# Patient Record
Sex: FEMALE | Race: WHITE | HISPANIC OR LATINO | URBAN - METROPOLITAN AREA
[De-identification: names, ages, dates, MRNs, and addresses within clinical notes are randomized per-mention and may not be internally consistent; named-entity substitution may affect disease eponyms.]

---

## 2017-03-06 ENCOUNTER — INPATIENT (INPATIENT)
Facility: HOSPITAL | Age: 42
LOS: 4 days | Discharge: HOME | End: 2017-03-11
Attending: OBSTETRICS & GYNECOLOGY | Admitting: OBSTETRICS & GYNECOLOGY
Payer: COMMERCIAL

## 2017-03-06 VITALS — WEIGHT: 184.97 LBS | HEIGHT: 61 IN

## 2017-03-06 LAB
ALBUMIN SERPL ELPH-MCNC: 2.8 G/DL — LOW (ref 3.4–5)
ALP SERPL-CCNC: 138 U/L — HIGH (ref 40–120)
ALT FLD-CCNC: 14 U/L — SIGNIFICANT CHANGE UP (ref 12–42)
ANION GAP SERPL CALC-SCNC: 10 MMOL/L — SIGNIFICANT CHANGE UP (ref 9–16)
AST SERPL-CCNC: 12 U/L — LOW (ref 15–37)
BASOPHILS NFR BLD AUTO: 0.2 % — SIGNIFICANT CHANGE UP (ref 0–2)
BILIRUB SERPL-MCNC: 0.3 MG/DL — SIGNIFICANT CHANGE UP (ref 0.2–1.2)
BLD GP AB SCN SERPL QL: NEGATIVE — SIGNIFICANT CHANGE UP
BLD GP AB SCN SERPL QL: NEGATIVE — SIGNIFICANT CHANGE UP
BUN SERPL-MCNC: 12 MG/DL — SIGNIFICANT CHANGE UP (ref 7–23)
CALCIUM SERPL-MCNC: 9.4 MG/DL — SIGNIFICANT CHANGE UP (ref 8.5–10.5)
CHLORIDE SERPL-SCNC: 103 MMOL/L — SIGNIFICANT CHANGE UP (ref 96–108)
CO2 SERPL-SCNC: 22 MMOL/L — SIGNIFICANT CHANGE UP (ref 22–31)
CREAT SERPL-MCNC: 0.59 MG/DL — SIGNIFICANT CHANGE UP (ref 0.5–1.3)
EOSINOPHIL NFR BLD AUTO: 0.3 % — SIGNIFICANT CHANGE UP (ref 0–6)
GLUCOSE SERPL-MCNC: 121 MG/DL — HIGH (ref 70–99)
HCT VFR BLD CALC: 40.9 % — SIGNIFICANT CHANGE UP (ref 34.5–45)
HGB BLD-MCNC: 13.8 G/DL — SIGNIFICANT CHANGE UP (ref 11.5–15.5)
LDH SERPL L TO P-CCNC: 157 U/L — SIGNIFICANT CHANGE UP (ref 84–246)
LYMPHOCYTES # BLD AUTO: 29.2 % — SIGNIFICANT CHANGE UP (ref 13–44)
MCHC RBC-ENTMCNC: 28.6 PG — SIGNIFICANT CHANGE UP (ref 27–34)
MCHC RBC-ENTMCNC: 33.7 G/DL — SIGNIFICANT CHANGE UP (ref 32–36)
MCV RBC AUTO: 84.9 FL — SIGNIFICANT CHANGE UP (ref 80–100)
MONOCYTES NFR BLD AUTO: 9.4 % — SIGNIFICANT CHANGE UP (ref 2–14)
NEUTROPHILS NFR BLD AUTO: 60.9 % — SIGNIFICANT CHANGE UP (ref 43–77)
PLATELET # BLD AUTO: 158 K/UL — SIGNIFICANT CHANGE UP (ref 150–400)
POTASSIUM SERPL-MCNC: 4.3 MMOL/L — SIGNIFICANT CHANGE UP (ref 3.5–5.3)
POTASSIUM SERPL-SCNC: 4.3 MMOL/L — SIGNIFICANT CHANGE UP (ref 3.5–5.3)
PROT SERPL-MCNC: 7.8 G/DL — SIGNIFICANT CHANGE UP (ref 6.4–8.2)
RBC # BLD: 4.82 M/UL — SIGNIFICANT CHANGE UP (ref 3.8–5.2)
RBC # FLD: 14.9 % — SIGNIFICANT CHANGE UP (ref 10.3–16.9)
RH IG SCN BLD-IMP: POSITIVE — SIGNIFICANT CHANGE UP
RH IG SCN BLD-IMP: POSITIVE — SIGNIFICANT CHANGE UP
SODIUM SERPL-SCNC: 135 MMOL/L — SIGNIFICANT CHANGE UP (ref 135–145)
T PALLIDUM AB TITR SER: NEGATIVE — SIGNIFICANT CHANGE UP
URATE SERPL-MCNC: 4.4 MG/DL — SIGNIFICANT CHANGE UP (ref 2.6–6)
WBC # BLD: 6.4 K/UL — SIGNIFICANT CHANGE UP (ref 3.8–10.5)
WBC # FLD AUTO: 6.4 K/UL — SIGNIFICANT CHANGE UP (ref 3.8–10.5)

## 2017-03-06 RX ORDER — ACETAMINOPHEN 500 MG
650 TABLET ORAL EVERY 6 HOURS
Refills: 0 | Status: DISCONTINUED | OUTPATIENT
Start: 2017-03-06 | End: 2017-03-11

## 2017-03-06 RX ORDER — DOCUSATE SODIUM 100 MG
100 CAPSULE ORAL
Refills: 0 | Status: DISCONTINUED | OUTPATIENT
Start: 2017-03-06 | End: 2017-03-11

## 2017-03-06 RX ORDER — NALOXONE HYDROCHLORIDE 4 MG/.1ML
0.1 SPRAY NASAL
Refills: 0 | Status: DISCONTINUED | OUTPATIENT
Start: 2017-03-06 | End: 2017-03-11

## 2017-03-06 RX ORDER — CITRIC ACID/SODIUM CITRATE 300-500 MG
15 SOLUTION, ORAL ORAL EVERY 4 HOURS
Refills: 0 | Status: DISCONTINUED | OUTPATIENT
Start: 2017-03-06 | End: 2017-03-06

## 2017-03-06 RX ORDER — GLUCAGON INJECTION, SOLUTION 0.5 MG/.1ML
1 INJECTION, SOLUTION SUBCUTANEOUS ONCE
Refills: 0 | Status: DISCONTINUED | OUTPATIENT
Start: 2017-03-06 | End: 2017-03-08

## 2017-03-06 RX ORDER — DEXTROSE 50 % IN WATER 50 %
1 SYRINGE (ML) INTRAVENOUS ONCE
Refills: 0 | Status: DISCONTINUED | OUTPATIENT
Start: 2017-03-06 | End: 2017-03-08

## 2017-03-06 RX ORDER — SODIUM CHLORIDE 9 MG/ML
1000 INJECTION, SOLUTION INTRAVENOUS
Refills: 0 | Status: DISCONTINUED | OUTPATIENT
Start: 2017-03-06 | End: 2017-03-08

## 2017-03-06 RX ORDER — SODIUM CHLORIDE 9 MG/ML
1000 INJECTION, SOLUTION INTRAVENOUS ONCE
Refills: 0 | Status: DISCONTINUED | OUTPATIENT
Start: 2017-03-06 | End: 2017-03-06

## 2017-03-06 RX ORDER — HEPARIN SODIUM 5000 [USP'U]/ML
5000 INJECTION INTRAVENOUS; SUBCUTANEOUS EVERY 12 HOURS
Refills: 0 | Status: DISCONTINUED | OUTPATIENT
Start: 2017-03-06 | End: 2017-03-11

## 2017-03-06 RX ORDER — SODIUM CHLORIDE 9 MG/ML
1000 INJECTION, SOLUTION INTRAVENOUS
Refills: 0 | Status: DISCONTINUED | OUTPATIENT
Start: 2017-03-06 | End: 2017-03-07

## 2017-03-06 RX ORDER — PENICILLIN G POTASSIUM 5000000 [IU]/1
2.5 POWDER, FOR SOLUTION INTRAMUSCULAR; INTRAPLEURAL; INTRATHECAL; INTRAVENOUS EVERY 4 HOURS
Refills: 0 | Status: DISCONTINUED | OUTPATIENT
Start: 2017-03-06 | End: 2017-03-06

## 2017-03-06 RX ORDER — INSULIN LISPRO 100/ML
VIAL (ML) SUBCUTANEOUS
Refills: 0 | Status: DISCONTINUED | OUTPATIENT
Start: 2017-03-06 | End: 2017-03-08

## 2017-03-06 RX ORDER — DEXTROSE 50 % IN WATER 50 %
25 SYRINGE (ML) INTRAVENOUS ONCE
Refills: 0 | Status: DISCONTINUED | OUTPATIENT
Start: 2017-03-06 | End: 2017-03-08

## 2017-03-06 RX ORDER — PENICILLIN G POTASSIUM 5000000 [IU]/1
POWDER, FOR SOLUTION INTRAMUSCULAR; INTRAPLEURAL; INTRATHECAL; INTRAVENOUS
Refills: 0 | Status: DISCONTINUED | OUTPATIENT
Start: 2017-03-06 | End: 2017-03-06

## 2017-03-06 RX ORDER — TETANUS TOXOID, REDUCED DIPHTHERIA TOXOID AND ACELLULAR PERTUSSIS VACCINE, ADSORBED 5; 2.5; 8; 8; 2.5 [IU]/.5ML; [IU]/.5ML; UG/.5ML; UG/.5ML; UG/.5ML
0.5 SUSPENSION INTRAMUSCULAR ONCE
Refills: 0 | Status: DISCONTINUED | OUTPATIENT
Start: 2017-03-06 | End: 2017-03-11

## 2017-03-06 RX ORDER — FERROUS SULFATE 325(65) MG
325 TABLET ORAL DAILY
Refills: 0 | Status: DISCONTINUED | OUTPATIENT
Start: 2017-03-06 | End: 2017-03-11

## 2017-03-06 RX ORDER — IBUPROFEN 200 MG
600 TABLET ORAL EVERY 6 HOURS
Refills: 0 | Status: DISCONTINUED | OUTPATIENT
Start: 2017-03-06 | End: 2017-03-07

## 2017-03-06 RX ORDER — METOCLOPRAMIDE HCL 10 MG
10 TABLET ORAL ONCE
Refills: 0 | Status: COMPLETED | OUTPATIENT
Start: 2017-03-06 | End: 2017-03-09

## 2017-03-06 RX ORDER — SIMETHICONE 80 MG/1
80 TABLET, CHEWABLE ORAL EVERY 4 HOURS
Refills: 0 | Status: DISCONTINUED | OUTPATIENT
Start: 2017-03-06 | End: 2017-03-11

## 2017-03-06 RX ORDER — CITRIC ACID/SODIUM CITRATE 300-500 MG
30 SOLUTION, ORAL ORAL ONCE
Refills: 0 | Status: DISCONTINUED | OUTPATIENT
Start: 2017-03-06 | End: 2017-03-06

## 2017-03-06 RX ORDER — OXYTOCIN 10 UNIT/ML
41.67 VIAL (ML) INJECTION
Qty: 20 | Refills: 0 | Status: DISCONTINUED | OUTPATIENT
Start: 2017-03-06 | End: 2017-03-08

## 2017-03-06 RX ORDER — PENICILLIN G POTASSIUM 5000000 [IU]/1
5 POWDER, FOR SOLUTION INTRAMUSCULAR; INTRAPLEURAL; INTRATHECAL; INTRAVENOUS ONCE
Refills: 0 | Status: COMPLETED | OUTPATIENT
Start: 2017-03-06 | End: 2017-03-06

## 2017-03-06 RX ORDER — LANOLIN
1 OINTMENT (GRAM) TOPICAL
Refills: 0 | Status: DISCONTINUED | OUTPATIENT
Start: 2017-03-06 | End: 2017-03-11

## 2017-03-06 RX ORDER — CEFAZOLIN SODIUM 1 G
2000 VIAL (EA) INJECTION ONCE
Refills: 0 | Status: COMPLETED | OUTPATIENT
Start: 2017-03-06 | End: 2017-03-06

## 2017-03-06 RX ORDER — SODIUM CHLORIDE 9 MG/ML
1000 INJECTION, SOLUTION INTRAVENOUS
Refills: 0 | Status: DISCONTINUED | OUTPATIENT
Start: 2017-03-06 | End: 2017-03-06

## 2017-03-06 RX ORDER — GLYCERIN ADULT
1 SUPPOSITORY, RECTAL RECTAL AT BEDTIME
Refills: 0 | Status: DISCONTINUED | OUTPATIENT
Start: 2017-03-06 | End: 2017-03-11

## 2017-03-06 RX ORDER — OXYTOCIN 10 UNIT/ML
333.33 VIAL (ML) INJECTION
Qty: 20 | Refills: 0 | Status: DISCONTINUED | OUTPATIENT
Start: 2017-03-06 | End: 2017-03-06

## 2017-03-06 RX ORDER — ONDANSETRON 8 MG/1
4 TABLET, FILM COATED ORAL EVERY 6 HOURS
Refills: 0 | Status: DISCONTINUED | OUTPATIENT
Start: 2017-03-06 | End: 2017-03-11

## 2017-03-06 RX ORDER — DEXTROSE 50 % IN WATER 50 %
12.5 SYRINGE (ML) INTRAVENOUS ONCE
Refills: 0 | Status: DISCONTINUED | OUTPATIENT
Start: 2017-03-06 | End: 2017-03-08

## 2017-03-06 RX ORDER — DIPHENHYDRAMINE HCL 50 MG
25 CAPSULE ORAL EVERY 6 HOURS
Refills: 0 | Status: DISCONTINUED | OUTPATIENT
Start: 2017-03-06 | End: 2017-03-11

## 2017-03-06 RX ADMIN — SODIUM CHLORIDE 125 MILLILITER(S): 9 INJECTION, SOLUTION INTRAVENOUS at 13:08

## 2017-03-06 RX ADMIN — Medication 600 MILLIGRAM(S): at 13:53

## 2017-03-06 RX ADMIN — Medication 600 MILLIGRAM(S): at 21:42

## 2017-03-06 RX ADMIN — Medication 600 MILLIGRAM(S): at 22:40

## 2017-03-06 RX ADMIN — Medication 600 MILLIGRAM(S): at 06:41

## 2017-03-06 RX ADMIN — PENICILLIN G POTASSIUM 200 MILLION UNIT(S): 5000000 POWDER, FOR SOLUTION INTRAMUSCULAR; INTRAPLEURAL; INTRATHECAL; INTRAVENOUS at 02:30

## 2017-03-06 RX ADMIN — Medication 600 MILLIGRAM(S): at 15:07

## 2017-03-06 RX ADMIN — HEPARIN SODIUM 5000 UNIT(S): 5000 INJECTION INTRAVENOUS; SUBCUTANEOUS at 19:52

## 2017-03-06 RX ADMIN — Medication 600 MILLIGRAM(S): at 07:21

## 2017-03-06 RX ADMIN — Medication 325 MILLIGRAM(S): at 13:48

## 2017-03-06 RX ADMIN — Medication 1 TABLET(S): at 13:48

## 2017-03-07 LAB
ALBUMIN SERPL ELPH-MCNC: 1.9 G/DL — LOW (ref 3.4–5)
ALP SERPL-CCNC: 90 U/L — SIGNIFICANT CHANGE UP (ref 40–120)
ALT FLD-CCNC: 12 U/L — SIGNIFICANT CHANGE UP (ref 12–42)
ANION GAP SERPL CALC-SCNC: 8 MMOL/L — LOW (ref 9–16)
APTT BLD: 25.2 SEC — LOW (ref 27.5–37.4)
AST SERPL-CCNC: 21 U/L — SIGNIFICANT CHANGE UP (ref 15–37)
BASOPHILS NFR BLD AUTO: 0.1 % — SIGNIFICANT CHANGE UP (ref 0–2)
BILIRUB SERPL-MCNC: 0.3 MG/DL — SIGNIFICANT CHANGE UP (ref 0.2–1.2)
BUN SERPL-MCNC: 9 MG/DL — SIGNIFICANT CHANGE UP (ref 7–23)
CALCIUM SERPL-MCNC: 8 MG/DL — LOW (ref 8.5–10.5)
CHLORIDE SERPL-SCNC: 108 MMOL/L — SIGNIFICANT CHANGE UP (ref 96–108)
CO2 SERPL-SCNC: 23 MMOL/L — SIGNIFICANT CHANGE UP (ref 22–31)
CREAT SERPL-MCNC: 0.46 MG/DL — LOW (ref 0.5–1.3)
EOSINOPHIL NFR BLD AUTO: 0.3 % — SIGNIFICANT CHANGE UP (ref 0–6)
EXTRA LAVENDER TOP TUBE: SIGNIFICANT CHANGE UP
FIBRINOGEN PPP-MCNC: 603 MG/DL — HIGH (ref 258–438)
GLUCOSE SERPL-MCNC: 87 MG/DL — SIGNIFICANT CHANGE UP (ref 70–99)
HCT VFR BLD CALC: 33.7 % — LOW (ref 34.5–45)
HCT VFR BLD CALC: 34.6 % — SIGNIFICANT CHANGE UP (ref 34.5–45)
HGB BLD-MCNC: 11.2 G/DL — LOW (ref 11.5–15.5)
HGB BLD-MCNC: 11.6 G/DL — SIGNIFICANT CHANGE UP (ref 11.5–15.5)
INR BLD: 0.88 — SIGNIFICANT CHANGE UP (ref 0.88–1.16)
LDH SERPL L TO P-CCNC: 289 U/L — HIGH (ref 84–246)
LYMPHOCYTES # BLD AUTO: 22.3 % — SIGNIFICANT CHANGE UP (ref 13–44)
MAGNESIUM SERPL-MCNC: 3.8 MG/DL — HIGH (ref 1.6–2.4)
MCHC RBC-ENTMCNC: 28.3 PG — SIGNIFICANT CHANGE UP (ref 27–34)
MCHC RBC-ENTMCNC: 28.4 PG — SIGNIFICANT CHANGE UP (ref 27–34)
MCHC RBC-ENTMCNC: 33.2 G/DL — SIGNIFICANT CHANGE UP (ref 32–36)
MCHC RBC-ENTMCNC: 33.5 G/DL — SIGNIFICANT CHANGE UP (ref 32–36)
MCV RBC AUTO: 84.4 FL — SIGNIFICANT CHANGE UP (ref 80–100)
MCV RBC AUTO: 85.3 FL — SIGNIFICANT CHANGE UP (ref 80–100)
MONOCYTES NFR BLD AUTO: 6.6 % — SIGNIFICANT CHANGE UP (ref 2–14)
NEUTROPHILS NFR BLD AUTO: 70.7 % — SIGNIFICANT CHANGE UP (ref 43–77)
PLATELET # BLD AUTO: 121 K/UL — LOW (ref 150–400)
PLATELET # BLD AUTO: 132 K/UL — LOW (ref 150–400)
POTASSIUM SERPL-MCNC: 4.2 MMOL/L — SIGNIFICANT CHANGE UP (ref 3.5–5.3)
POTASSIUM SERPL-SCNC: 4.2 MMOL/L — SIGNIFICANT CHANGE UP (ref 3.5–5.3)
PROT SERPL-MCNC: 5.5 G/DL — LOW (ref 6.4–8.2)
PROTHROM AB SERPL-ACNC: 9.8 SEC — LOW (ref 10–13.1)
RBC # BLD: 3.95 M/UL — SIGNIFICANT CHANGE UP (ref 3.8–5.2)
RBC # BLD: 4.1 M/UL — SIGNIFICANT CHANGE UP (ref 3.8–5.2)
RBC # FLD: 15.1 % — SIGNIFICANT CHANGE UP (ref 10.3–16.9)
RBC # FLD: 15.1 % — SIGNIFICANT CHANGE UP (ref 10.3–16.9)
SODIUM SERPL-SCNC: 139 MMOL/L — SIGNIFICANT CHANGE UP (ref 135–145)
URATE SERPL-MCNC: 4.2 MG/DL — SIGNIFICANT CHANGE UP (ref 2.6–6)
WBC # BLD: 7.5 K/UL — SIGNIFICANT CHANGE UP (ref 3.8–10.5)
WBC # BLD: 7.7 K/UL — SIGNIFICANT CHANGE UP (ref 3.8–10.5)
WBC # FLD AUTO: 7.5 K/UL — SIGNIFICANT CHANGE UP (ref 3.8–10.5)
WBC # FLD AUTO: 7.7 K/UL — SIGNIFICANT CHANGE UP (ref 3.8–10.5)

## 2017-03-07 RX ORDER — LABETALOL HCL 100 MG
20 TABLET ORAL ONCE
Refills: 0 | Status: DISCONTINUED | OUTPATIENT
Start: 2017-03-07 | End: 2017-03-07

## 2017-03-07 RX ORDER — MAGNESIUM SULFATE 500 MG/ML
2 VIAL (ML) INJECTION
Qty: 40 | Refills: 0 | Status: COMPLETED | OUTPATIENT
Start: 2017-03-07 | End: 2017-03-08

## 2017-03-07 RX ORDER — HYDRALAZINE HCL 50 MG
5 TABLET ORAL ONCE
Refills: 0 | Status: COMPLETED | OUTPATIENT
Start: 2017-03-07 | End: 2017-03-07

## 2017-03-07 RX ORDER — LABETALOL HCL 100 MG
20 TABLET ORAL ONCE
Refills: 0 | Status: COMPLETED | OUTPATIENT
Start: 2017-03-07 | End: 2017-03-07

## 2017-03-07 RX ORDER — MAGNESIUM SULFATE 500 MG/ML
4 VIAL (ML) INJECTION ONCE
Refills: 0 | Status: COMPLETED | OUTPATIENT
Start: 2017-03-07 | End: 2017-03-07

## 2017-03-07 RX ORDER — SODIUM CHLORIDE 9 MG/ML
1000 INJECTION, SOLUTION INTRAVENOUS
Refills: 0 | Status: DISCONTINUED | OUTPATIENT
Start: 2017-03-07 | End: 2017-03-10

## 2017-03-07 RX ADMIN — SODIUM CHLORIDE 125 MILLILITER(S): 9 INJECTION, SOLUTION INTRAVENOUS at 17:22

## 2017-03-07 RX ADMIN — Medication 600 MILLIGRAM(S): at 06:39

## 2017-03-07 RX ADMIN — Medication 325 MILLIGRAM(S): at 11:52

## 2017-03-07 RX ADMIN — Medication 1 TABLET(S): at 12:26

## 2017-03-07 RX ADMIN — Medication 5 MILLIGRAM(S): at 19:08

## 2017-03-07 RX ADMIN — HEPARIN SODIUM 5000 UNIT(S): 5000 INJECTION INTRAVENOUS; SUBCUTANEOUS at 06:54

## 2017-03-07 RX ADMIN — Medication 1 TABLET(S): at 11:45

## 2017-03-07 RX ADMIN — Medication 50 GM/HR: at 18:00

## 2017-03-07 RX ADMIN — Medication 1 TABLET(S): at 11:52

## 2017-03-07 RX ADMIN — Medication 300 GRAM(S): at 17:26

## 2017-03-07 RX ADMIN — Medication 20 MILLIGRAM(S): at 12:30

## 2017-03-07 RX ADMIN — Medication 600 MILLIGRAM(S): at 05:41

## 2017-03-08 LAB
BASOPHILS NFR BLD AUTO: 0 % — SIGNIFICANT CHANGE UP (ref 0–2)
EOSINOPHIL NFR BLD AUTO: 0.8 % — SIGNIFICANT CHANGE UP (ref 0–6)
HCT VFR BLD CALC: 35.7 % — SIGNIFICANT CHANGE UP (ref 34.5–45)
HGB BLD-MCNC: 12.2 G/DL — SIGNIFICANT CHANGE UP (ref 11.5–15.5)
LYMPHOCYTES # BLD AUTO: 19.2 % — SIGNIFICANT CHANGE UP (ref 13–44)
MAGNESIUM SERPL-MCNC: 4.5 MG/DL — HIGH (ref 1.6–2.4)
MAGNESIUM SERPL-MCNC: 5.2 MG/DL — HIGH (ref 1.6–2.4)
MAGNESIUM SERPL-MCNC: 5.3 MG/DL — HIGH (ref 1.6–2.4)
MCHC RBC-ENTMCNC: 28.7 PG — SIGNIFICANT CHANGE UP (ref 27–34)
MCHC RBC-ENTMCNC: 34.2 G/DL — SIGNIFICANT CHANGE UP (ref 32–36)
MCV RBC AUTO: 84 FL — SIGNIFICANT CHANGE UP (ref 80–100)
MONOCYTES NFR BLD AUTO: 5.9 % — SIGNIFICANT CHANGE UP (ref 2–14)
NEUTROPHILS NFR BLD AUTO: 74.1 % — SIGNIFICANT CHANGE UP (ref 43–77)
PLATELET # BLD AUTO: 156 K/UL — SIGNIFICANT CHANGE UP (ref 150–400)
RBC # BLD: 4.25 M/UL — SIGNIFICANT CHANGE UP (ref 3.8–5.2)
RBC # FLD: 15.4 % — SIGNIFICANT CHANGE UP (ref 10.3–16.9)
T PALLIDUM AB TITR SER: NEGATIVE — SIGNIFICANT CHANGE UP
WBC # BLD: 7.8 K/UL — SIGNIFICANT CHANGE UP (ref 3.8–10.5)
WBC # FLD AUTO: 7.8 K/UL — SIGNIFICANT CHANGE UP (ref 3.8–10.5)

## 2017-03-08 RX ORDER — LABETALOL HCL 100 MG
200 TABLET ORAL
Refills: 0 | Status: DISCONTINUED | OUTPATIENT
Start: 2017-03-08 | End: 2017-03-09

## 2017-03-08 RX ORDER — HYDROCORTISONE 1 %
1 OINTMENT (GRAM) TOPICAL EVERY 4 HOURS
Refills: 0 | Status: DISCONTINUED | OUTPATIENT
Start: 2017-03-08 | End: 2017-03-11

## 2017-03-08 RX ORDER — HYDRALAZINE HCL 50 MG
10 TABLET ORAL ONCE
Refills: 0 | Status: DISCONTINUED | OUTPATIENT
Start: 2017-03-08 | End: 2017-03-11

## 2017-03-08 RX ORDER — HYDRALAZINE HCL 50 MG
5 TABLET ORAL ONCE
Refills: 0 | Status: DISCONTINUED | OUTPATIENT
Start: 2017-03-08 | End: 2017-03-11

## 2017-03-08 RX ORDER — HYDRALAZINE HCL 50 MG
5 TABLET ORAL ONCE
Refills: 0 | Status: DISCONTINUED | OUTPATIENT
Start: 2017-03-08 | End: 2017-03-08

## 2017-03-08 RX ADMIN — Medication 325 MILLIGRAM(S): at 12:15

## 2017-03-08 RX ADMIN — Medication 100 MILLIGRAM(S): at 12:00

## 2017-03-08 RX ADMIN — HEPARIN SODIUM 5000 UNIT(S): 5000 INJECTION INTRAVENOUS; SUBCUTANEOUS at 06:43

## 2017-03-08 RX ADMIN — HEPARIN SODIUM 5000 UNIT(S): 5000 INJECTION INTRAVENOUS; SUBCUTANEOUS at 18:57

## 2017-03-08 RX ADMIN — Medication 50 GM/HR: at 14:00

## 2017-03-08 RX ADMIN — Medication 1 TABLET(S): at 12:15

## 2017-03-09 DIAGNOSIS — O14.90 UNSPECIFIED PRE-ECLAMPSIA, UNSPECIFIED TRIMESTER: ICD-10-CM

## 2017-03-09 LAB
ALBUMIN SERPL ELPH-MCNC: 2.1 G/DL — LOW (ref 3.4–5)
ALBUMIN SERPL ELPH-MCNC: 2.2 G/DL — LOW (ref 3.4–5)
ALP SERPL-CCNC: 88 U/L — SIGNIFICANT CHANGE UP (ref 40–120)
ALP SERPL-CCNC: 93 U/L — SIGNIFICANT CHANGE UP (ref 40–120)
ALT FLD-CCNC: 25 U/L — SIGNIFICANT CHANGE UP (ref 12–42)
ALT FLD-CCNC: 33 U/L — SIGNIFICANT CHANGE UP (ref 12–42)
ANION GAP SERPL CALC-SCNC: 10 MMOL/L — SIGNIFICANT CHANGE UP (ref 9–16)
ANION GAP SERPL CALC-SCNC: 9 MMOL/L — SIGNIFICANT CHANGE UP (ref 9–16)
APTT BLD: 26 SEC — LOW (ref 27.5–37.4)
APTT BLD: 27.1 SEC — LOW (ref 27.5–37.4)
AST SERPL-CCNC: 26 U/L — SIGNIFICANT CHANGE UP (ref 15–37)
AST SERPL-CCNC: 26 U/L — SIGNIFICANT CHANGE UP (ref 15–37)
BASOPHILS NFR BLD AUTO: 0.1 % — SIGNIFICANT CHANGE UP (ref 0–2)
BASOPHILS NFR BLD AUTO: 0.3 % — SIGNIFICANT CHANGE UP (ref 0–2)
BILIRUB SERPL-MCNC: 0.3 MG/DL — SIGNIFICANT CHANGE UP (ref 0.2–1.2)
BILIRUB SERPL-MCNC: 0.8 MG/DL — SIGNIFICANT CHANGE UP (ref 0.2–1.2)
BUN SERPL-MCNC: 4 MG/DL — LOW (ref 7–23)
BUN SERPL-MCNC: 4 MG/DL — LOW (ref 7–23)
CALCIUM SERPL-MCNC: 7.4 MG/DL — LOW (ref 8.5–10.5)
CALCIUM SERPL-MCNC: 8 MG/DL — LOW (ref 8.5–10.5)
CHLORIDE SERPL-SCNC: 105 MMOL/L — SIGNIFICANT CHANGE UP (ref 96–108)
CHLORIDE SERPL-SCNC: 106 MMOL/L — SIGNIFICANT CHANGE UP (ref 96–108)
CO2 SERPL-SCNC: 23 MMOL/L — SIGNIFICANT CHANGE UP (ref 22–31)
CO2 SERPL-SCNC: 26 MMOL/L — SIGNIFICANT CHANGE UP (ref 22–31)
CREAT SERPL-MCNC: 0.34 MG/DL — LOW (ref 0.5–1.3)
CREAT SERPL-MCNC: 0.39 MG/DL — LOW (ref 0.5–1.3)
EOSINOPHIL NFR BLD AUTO: 0.6 % — SIGNIFICANT CHANGE UP (ref 0–6)
EOSINOPHIL NFR BLD AUTO: 0.8 % — SIGNIFICANT CHANGE UP (ref 0–6)
FIBRINOGEN PPP-MCNC: 588 MG/DL — HIGH (ref 258–438)
FIBRINOGEN PPP-MCNC: 622 MG/DL — HIGH (ref 258–438)
GLUCOSE SERPL-MCNC: 124 MG/DL — HIGH (ref 70–99)
GLUCOSE SERPL-MCNC: 143 MG/DL — HIGH (ref 70–99)
HCT VFR BLD CALC: 34.9 % — SIGNIFICANT CHANGE UP (ref 34.5–45)
HCT VFR BLD CALC: 36.6 % — SIGNIFICANT CHANGE UP (ref 34.5–45)
HGB BLD-MCNC: 11.9 G/DL — SIGNIFICANT CHANGE UP (ref 11.5–15.5)
HGB BLD-MCNC: 12.3 G/DL — SIGNIFICANT CHANGE UP (ref 11.5–15.5)
INR BLD: 0.93 — SIGNIFICANT CHANGE UP (ref 0.88–1.16)
INR BLD: 0.95 — SIGNIFICANT CHANGE UP (ref 0.88–1.16)
LDH SERPL L TO P-CCNC: 256 U/L — HIGH (ref 84–246)
LDH SERPL L TO P-CCNC: 285 U/L — HIGH (ref 84–246)
LYMPHOCYTES # BLD AUTO: 13 % — SIGNIFICANT CHANGE UP (ref 13–44)
LYMPHOCYTES # BLD AUTO: 15.6 % — SIGNIFICANT CHANGE UP (ref 13–44)
MCHC RBC-ENTMCNC: 28.3 PG — SIGNIFICANT CHANGE UP (ref 27–34)
MCHC RBC-ENTMCNC: 28.9 PG — SIGNIFICANT CHANGE UP (ref 27–34)
MCHC RBC-ENTMCNC: 33.6 G/DL — SIGNIFICANT CHANGE UP (ref 32–36)
MCHC RBC-ENTMCNC: 34.1 G/DL — SIGNIFICANT CHANGE UP (ref 32–36)
MCV RBC AUTO: 84.1 FL — SIGNIFICANT CHANGE UP (ref 80–100)
MCV RBC AUTO: 84.7 FL — SIGNIFICANT CHANGE UP (ref 80–100)
MONOCYTES NFR BLD AUTO: 5.2 % — SIGNIFICANT CHANGE UP (ref 2–14)
MONOCYTES NFR BLD AUTO: 5.6 % — SIGNIFICANT CHANGE UP (ref 2–14)
NEUTROPHILS NFR BLD AUTO: 77.9 % — HIGH (ref 43–77)
NEUTROPHILS NFR BLD AUTO: 80.9 % — HIGH (ref 43–77)
PLATELET # BLD AUTO: 186 K/UL — SIGNIFICANT CHANGE UP (ref 150–400)
PLATELET # BLD AUTO: 202 K/UL — SIGNIFICANT CHANGE UP (ref 150–400)
POTASSIUM SERPL-MCNC: 3.5 MMOL/L — SIGNIFICANT CHANGE UP (ref 3.5–5.3)
POTASSIUM SERPL-MCNC: 3.7 MMOL/L — SIGNIFICANT CHANGE UP (ref 3.5–5.3)
POTASSIUM SERPL-SCNC: 3.5 MMOL/L — SIGNIFICANT CHANGE UP (ref 3.5–5.3)
POTASSIUM SERPL-SCNC: 3.7 MMOL/L — SIGNIFICANT CHANGE UP (ref 3.5–5.3)
PROT SERPL-MCNC: 6.4 G/DL — SIGNIFICANT CHANGE UP (ref 6.4–8.2)
PROT SERPL-MCNC: 6.4 G/DL — SIGNIFICANT CHANGE UP (ref 6.4–8.2)
PROTHROM AB SERPL-ACNC: 10.3 SEC — SIGNIFICANT CHANGE UP (ref 10–13.1)
PROTHROM AB SERPL-ACNC: 10.5 SEC — SIGNIFICANT CHANGE UP (ref 10–13.1)
RBC # BLD: 4.12 M/UL — SIGNIFICANT CHANGE UP (ref 3.8–5.2)
RBC # BLD: 4.35 M/UL — SIGNIFICANT CHANGE UP (ref 3.8–5.2)
RBC # FLD: 15.1 % — SIGNIFICANT CHANGE UP (ref 10.3–16.9)
RBC # FLD: 15.6 % — SIGNIFICANT CHANGE UP (ref 10.3–16.9)
SODIUM SERPL-SCNC: 139 MMOL/L — SIGNIFICANT CHANGE UP (ref 135–145)
SODIUM SERPL-SCNC: 140 MMOL/L — SIGNIFICANT CHANGE UP (ref 135–145)
URATE SERPL-MCNC: 4.1 MG/DL — SIGNIFICANT CHANGE UP (ref 2.6–6)
URATE SERPL-MCNC: 4.1 MG/DL — SIGNIFICANT CHANGE UP (ref 2.6–6)
WBC # BLD: 7.2 K/UL — SIGNIFICANT CHANGE UP (ref 3.8–10.5)
WBC # BLD: 7.7 K/UL — SIGNIFICANT CHANGE UP (ref 3.8–10.5)
WBC # FLD AUTO: 7.2 K/UL — SIGNIFICANT CHANGE UP (ref 3.8–10.5)
WBC # FLD AUTO: 7.7 K/UL — SIGNIFICANT CHANGE UP (ref 3.8–10.5)

## 2017-03-09 PROCEDURE — 93010 ELECTROCARDIOGRAM REPORT: CPT

## 2017-03-09 PROCEDURE — 93970 EXTREMITY STUDY: CPT | Mod: 26

## 2017-03-09 RX ORDER — LABETALOL HCL 100 MG
400 TABLET ORAL EVERY 8 HOURS
Refills: 0 | Status: DISCONTINUED | OUTPATIENT
Start: 2017-03-09 | End: 2017-03-11

## 2017-03-09 RX ORDER — LABETALOL HCL 100 MG
200 TABLET ORAL EVERY 8 HOURS
Refills: 0 | Status: DISCONTINUED | OUTPATIENT
Start: 2017-03-09 | End: 2017-03-09

## 2017-03-09 RX ORDER — LABETALOL HCL 100 MG
20 TABLET ORAL ONCE
Refills: 0 | Status: DISCONTINUED | OUTPATIENT
Start: 2017-03-09 | End: 2017-03-11

## 2017-03-09 RX ORDER — NIFEDIPINE 30 MG
30 TABLET, EXTENDED RELEASE 24 HR ORAL ONCE
Refills: 0 | Status: COMPLETED | OUTPATIENT
Start: 2017-03-09 | End: 2017-03-09

## 2017-03-09 RX ORDER — NIFEDIPINE 30 MG
30 TABLET, EXTENDED RELEASE 24 HR ORAL DAILY
Refills: 0 | Status: DISCONTINUED | OUTPATIENT
Start: 2017-03-09 | End: 2017-03-11

## 2017-03-09 RX ORDER — LABETALOL HCL 100 MG
100 TABLET ORAL ONCE
Refills: 0 | Status: COMPLETED | OUTPATIENT
Start: 2017-03-09 | End: 2017-03-09

## 2017-03-09 RX ORDER — LABETALOL HCL 100 MG
300 TABLET ORAL THREE TIMES A DAY
Refills: 0 | Status: DISCONTINUED | OUTPATIENT
Start: 2017-03-09 | End: 2017-03-09

## 2017-03-09 RX ADMIN — Medication 30 MILLIGRAM(S): at 12:29

## 2017-03-09 RX ADMIN — Medication 100 MILLIGRAM(S): at 17:36

## 2017-03-09 RX ADMIN — HEPARIN SODIUM 5000 UNIT(S): 5000 INJECTION INTRAVENOUS; SUBCUTANEOUS at 18:53

## 2017-03-09 RX ADMIN — Medication 200 MILLIGRAM(S): at 00:05

## 2017-03-09 RX ADMIN — HEPARIN SODIUM 5000 UNIT(S): 5000 INJECTION INTRAVENOUS; SUBCUTANEOUS at 07:10

## 2017-03-09 RX ADMIN — Medication 300 MILLIGRAM(S): at 14:58

## 2017-03-09 RX ADMIN — Medication 30 MILLIGRAM(S): at 19:57

## 2017-03-09 RX ADMIN — Medication 200 MILLIGRAM(S): at 08:32

## 2017-03-09 RX ADMIN — Medication 10 MILLIGRAM(S): at 16:55

## 2017-03-09 RX ADMIN — Medication 400 MILLIGRAM(S): at 22:05

## 2017-03-09 NOTE — CONSULT NOTE ADULT - PROBLEM SELECTOR RECOMMENDATION 9
goal bp 150/100  c/w current dosing of labetolol 400mg tid  one dose of  nifedipine 30mg this evening  if bp goes above 160/110 can give iv hydralazine 10mg  avoid nsaids  c/w bedrest

## 2017-03-09 NOTE — CONSULT NOTE ADULT - SUBJECTIVE AND OBJECTIVE BOX
43 yo  s/p CS POD #3 presents to nephrology service with pre-eclampsia.   pt blood pressures ranging from 140/78 to 186/106. pt has had labetolol increased from 200mg tid to 400mg tid and bp has decreased to 148/82.   pt concurrently has been receiving nifedipine 30mg daily. additionally pt has received 20mg iv labetolol overnight.   pt currently reporting doing well. denies any vision abnormalities  notes no sob, chest pain, nausea vomitting or diarrhea  surgical wound recovering well.   :      MEDICATIONS  (STANDING):  diphtheria/tetanus/pertussis (acellular) Vaccine (ADAcel) 0.5milliLiter(s) IntraMuscular once  ferrous    sulfate 325milliGRAM(s) Oral daily  prenatal multivitamin 1Tablet(s) Oral daily  heparin  Injectable 5000Unit(s) SubCutaneous every 12 hours  lactated ringers. 1000milliLiter(s) IV Continuous <Continuous>  hydrALAZINE Injectable 10milliGRAM(s) IV Push once  hydrALAZINE Injectable 5milliGRAM(s) IV Push once  labetalol Injectable 20milliGRAM(s) IV Push once  NIFEdipine XL 30milliGRAM(s) Oral daily  labetalol 400milliGRAM(s) Oral every 8 hours    MEDICATIONS  (PRN):  naloxone Injectable 0.1milliGRAM(s) IV Push every 3 minutes PRN For ANY of the following changes in patient status:  A. RR LESS THAN 10 breaths per minute, B. Oxygen saturation LESS THAN 90%, C. Sedation score of 6  ondansetron Injectable 4milliGRAM(s) IV Push every 6 hours PRN Nausea  acetaminophen   Tablet. 650milliGRAM(s) Oral every 6 hours PRN Mild Pain (1 - 3)  acetaminophen   Tablet 650milliGRAM(s) Oral every 6 hours PRN For Temp greater than 38.5 C (101.3 F)  oxyCODONE  5 mG/acetaminophen 325 mG 1Tablet(s) Oral every 3 hours PRN Moderate Pain  oxyCODONE  5 mG/acetaminophen 325 mG 2Tablet(s) Oral every 6 hours PRN Severe Pain  simethicone 80milliGRAM(s) Chew every 4 hours PRN Gas  diphenhydrAMINE   Capsule 25milliGRAM(s) Oral every 6 hours PRN Itching  glycerin Suppository - Adult 1Suppository(s) Rectal at bedtime PRN Constipation  docusate sodium 100milliGRAM(s) Oral two times a day PRN Stool Softening  lanolin Ointment 1Application(s) Topical every 3 hours PRN Sore Nipples  acetaminophen/butalbital/caffeine 1Tablet(s) Oral every 6 hours PRN headache  hydrocortisone 1% Cream 1Application(s) Topical every 4 hours PRN Rash      Allergies    No Known Allergies    Intolerances        SOCIAL HISTORY:    FAMILY HISTORY:      T(C): , Max: 37.1 ( @ 10:38)  T(F): , Max: 98.8 ( @ 10:38)  HR: 88  BP: 148/82  BP(mean): --  RR: 20  SpO2: 95%  Wt(kg): --    I & Os for current day (as of  @ 18:34)  =============================================  IN: 1250 ml / OUT: 900 ml / NET: 350 ml        PHYSICAL EXAM:  Constitutional: Well appearning.  No acute distress  ENMT: Moist mucous membrane.  No cyanosis.  Neck: Supple. No JVD.  Back: No CVA tenderness  Respiratory: Clear to auscultation   Cardiovascular: S1, S2.  Regular rate and rhythm.    Gastrointestinal: soft, mild tenderness  Extremities: Warm.  one plus  lower extremity edema.    Neurological: No focal deficits.  Skin: Warm. Dry.    Lymph Nodes:  No cervical lymph nodes.    Psychiatric: Normal affect.      LABS:                        11.9   7.2   )-----------( 202      ( 09 Mar 2017 13:13 )             34.9     09 Mar 2017 13:13    139    |  106    |  4      ----------------------------<  124    3.7     |  23     |  0.34     Ca    8.0        09 Mar 2017 13:13  Mg     5.2       08 Mar 2017 13:20    TPro  6.4    /  Alb  2.2    /  TBili  0.3    /  DBili  x      /  AST  26     /  ALT  33     /  AlkPhos  88     09 Mar 2017 13:13    Uric Acid, Serum: 4.1 mg/dL [2.6 - 6.0] ( @ 13:13)  Uric Acid, Serum: 4.1 mg/dL [2.6 - 6.0] ( @ 01:45)    PT/INR - ( 09 Mar 2017 13:13 )   PT: 10.5 sec;   INR: 0.95          PTT - ( 09 Mar 2017 13:13 )  PTT:27.1 sec

## 2017-03-10 LAB — HBA1C BLD-MCNC: 8.4 % — HIGH (ref 4.8–5.6)

## 2017-03-10 PROCEDURE — 70551 MRI BRAIN STEM W/O DYE: CPT | Mod: 26

## 2017-03-10 PROCEDURE — 70544 MR ANGIOGRAPHY HEAD W/O DYE: CPT | Mod: 26,59

## 2017-03-10 PROCEDURE — 99254 IP/OBS CNSLTJ NEW/EST MOD 60: CPT

## 2017-03-10 RX ORDER — DEXAMETHASONE 0.5 MG/5ML
10 ELIXIR ORAL ONCE
Refills: 0 | Status: DISCONTINUED | OUTPATIENT
Start: 2017-03-10 | End: 2017-03-11

## 2017-03-10 RX ORDER — SODIUM CHLORIDE 9 MG/ML
500 INJECTION, SOLUTION INTRAVENOUS
Refills: 0 | Status: DISCONTINUED | OUTPATIENT
Start: 2017-03-10 | End: 2017-03-11

## 2017-03-10 RX ORDER — MAGNESIUM OXIDE 400 MG ORAL TABLET 241.3 MG
400 TABLET ORAL EVERY 12 HOURS
Refills: 0 | Status: DISCONTINUED | OUTPATIENT
Start: 2017-03-11 | End: 2017-03-11

## 2017-03-10 RX ORDER — DEXTROSE 50 % IN WATER 50 %
12.5 SYRINGE (ML) INTRAVENOUS ONCE
Refills: 0 | Status: DISCONTINUED | OUTPATIENT
Start: 2017-03-10 | End: 2017-03-11

## 2017-03-10 RX ORDER — METOCLOPRAMIDE HCL 10 MG
10 TABLET ORAL ONCE
Refills: 0 | Status: DISCONTINUED | OUTPATIENT
Start: 2017-03-10 | End: 2017-03-11

## 2017-03-10 RX ORDER — DEXTROSE 50 % IN WATER 50 %
25 SYRINGE (ML) INTRAVENOUS ONCE
Refills: 0 | Status: DISCONTINUED | OUTPATIENT
Start: 2017-03-10 | End: 2017-03-11

## 2017-03-10 RX ORDER — GLUCAGON INJECTION, SOLUTION 0.5 MG/.1ML
1 INJECTION, SOLUTION SUBCUTANEOUS ONCE
Refills: 0 | Status: DISCONTINUED | OUTPATIENT
Start: 2017-03-10 | End: 2017-03-11

## 2017-03-10 RX ORDER — MAGNESIUM SULFATE 500 MG/ML
2 VIAL (ML) INJECTION ONCE
Refills: 0 | Status: COMPLETED | OUTPATIENT
Start: 2017-03-10 | End: 2017-03-10

## 2017-03-10 RX ORDER — SODIUM CHLORIDE 9 MG/ML
1000 INJECTION, SOLUTION INTRAVENOUS
Refills: 0 | Status: DISCONTINUED | OUTPATIENT
Start: 2017-03-10 | End: 2017-03-11

## 2017-03-10 RX ORDER — DEXTROSE 50 % IN WATER 50 %
1 SYRINGE (ML) INTRAVENOUS ONCE
Refills: 0 | Status: DISCONTINUED | OUTPATIENT
Start: 2017-03-10 | End: 2017-03-11

## 2017-03-10 RX ADMIN — Medication 400 MILLIGRAM(S): at 14:37

## 2017-03-10 RX ADMIN — Medication 100 MILLIGRAM(S): at 14:38

## 2017-03-10 RX ADMIN — Medication 400 MILLIGRAM(S): at 06:23

## 2017-03-10 RX ADMIN — Medication 650 MILLIGRAM(S): at 11:00

## 2017-03-10 RX ADMIN — HEPARIN SODIUM 5000 UNIT(S): 5000 INJECTION INTRAVENOUS; SUBCUTANEOUS at 18:40

## 2017-03-10 RX ADMIN — Medication 650 MILLIGRAM(S): at 10:08

## 2017-03-10 RX ADMIN — Medication 1 TABLET(S): at 14:38

## 2017-03-10 RX ADMIN — Medication 325 MILLIGRAM(S): at 14:38

## 2017-03-10 RX ADMIN — Medication 30 MILLIGRAM(S): at 06:23

## 2017-03-10 RX ADMIN — HEPARIN SODIUM 5000 UNIT(S): 5000 INJECTION INTRAVENOUS; SUBCUTANEOUS at 06:23

## 2017-03-10 RX ADMIN — Medication 100 MILLIGRAM(S): at 21:27

## 2017-03-10 RX ADMIN — Medication 50 GRAM(S): at 19:16

## 2017-03-10 RX ADMIN — Medication 400 MILLIGRAM(S): at 22:00

## 2017-03-10 NOTE — PROGRESS NOTE ADULT - RS GEN PE MLT RESP DETAILS PC
normal/airway patent/breath sounds equal/good air movement/respirations non-labored/clear to auscultation bilaterally

## 2017-03-10 NOTE — CONSULT NOTE ADULT - SUBJECTIVE AND OBJECTIVE BOX
NEUROLOGY INITIAL CONSULT NOTE    CHIEF COMPLAINT:  Headaches    HPI:  Ms. Pablo is a 42-year-old right-handed woman, , POD #4 after emergency  done in setting of preeclampsia, with persistent headaches.  She states that she has suffered from headaches since age 25.  Headaches are localized to the left side of the head, severe 8-10 out of 10 in intensity, pulsating in quality, and associated with flashing lights, nausea, photophobia, and osmophobia.  Prior to pregnancy, she had been taking Excedrin 2-3 times per week for headaches with good relief.  However, she stopped taking Excedrin once she became pregnant.  Throughout the recent pregnancy, she reports increasing frequency and severity of headaches.  She was scheduled to have an MRI of the brain as outpatient in February, but did not show due to fear that being in the scanner would harm the baby.  In the past 4 days since delivery, she reports excruciating headaches which have been unresponsive to Tylenol.  She was evaluated by a neurologist in the remote past, at which time a brain MRI was reportedly done - results never communicated to the patient.    PAST MEDICAL & SURGICAL HISTORY:    Preeclampsia  Gestational Diabetes  Kidney stones s/p lithotripsy  Migraines      REVIEW OF SYSTEMS:  As per HPI, otherwise negative for Constitutional, Eyes, Ears/Nose/Mouth/Throat, Neck, Cardiovascular, Respiratory, Gastrointestinal, Genitourinary, Skin, Endocrine, Musculoskeletal, Psychiatric, and Hematologic/Lymphatic.    MEDICATIONS  (STANDING):  Ferrous sulfate 325milliGRAM(s) Oral daily  Prenatal multivitamin 1Tablet(s) Oral daily  Heparin injectable 5000Unit(s) SubCutaneous every 12 hours  Nifedipine XL 30milliGRAM(s) Oral daily  Labetalol 400milliGRAM(s) Oral every 8 hours    MEDICATIONS  (PRN):  naloxone Injectable 0.1milliGRAM(s) IV Push every 3 minutes PRN For ANY of the following changes in patient status:  A. RR LESS THAN 10 breaths per minute, B. Oxygen saturation LESS THAN 90%, C. Sedation score of 6  ondansetron Injectable 4milliGRAM(s) IV Push every 6 hours PRN Nausea  acetaminophen   Tablet. 650milliGRAM(s) Oral every 6 hours PRN Mild Pain (1 - 3)  oxyCODONE  5 mG/acetaminophen 325 mG 1Tablet(s) Oral every 3 hours PRN Moderate Pain  simethicone 80milliGRAM(s) Chew every 4 hours PRN Gas  diphenhydrAMINE   Capsule 25milliGRAM(s) Oral every 6 hours PRN Itching  glycerin Suppository - Adult 1Suppository(s) Rectal at bedtime PRN Constipation  docusate sodium 100milliGRAM(s) Oral two times a day PRN Stool Softening  lanolin Ointment 1Application(s) Topical every 3 hours PRN Sore Nipples  acetaminophen/butalbital/caffeine 1Tablet(s) Oral every 6 hours PRN headache  hydrocortisone 1% Cream 1Application(s) Topical every 4 hours PRN Rash    ALLERGIES:  No Known Allergies    FAMILY HISTORY:  No family history of migraines or neurologic disease.    SOCIAL HISTORY:  Living Situation:  Lives in [place] with  and 3 children.  Occupation:  Works for the [employer].  Tobacco:  Denies.  Alcohol:  Denies.  Drug use:  Denies.    VITAL SIGNS:  Vital Signs Last 24 Hrs  T(C): 37.2, Max: 37.2 (03-10 @ 20:10)  T(F): 99, Max: 99 (03-10 @ 20:10)  HR: 81 (81 - 90)  BP: 130/76 (117/73 - 153/92)  RR: 18 (18 - 19)  SpO2: 97% (95% - 97%)    PHYSICAL EXAMINATION:  General: Well-developed, well nourished, in no acute distress.  Eyes: Conjunctiva and sclera clear.  Cardiovascular: 2+ distal pulses.  Neurologic:  - Mental Status:  Alert, awake, oriented to person, place, and time; Speech is fluent; Good overall fund of knowledge.  - Cranial Nerves II-XII:    II:  Visual fields are full to confrontation; Fundoscopic exam is normal with sharp discs; Pupils are equal, round, and reactive to light.  III, IV, VI:  Extraocular movements are intact without nystagmus.  V:  Facial sensation is intact in the V1-V3 distribution bilaterally.  VII:  Face is symmetric with normal eye closure and smile  VIII:  Hearing is intact to finger rub.  IX, X:  Uvula is midline and soft palate rises symmetrically  XI:  Head turning and shoulder shrug are intact.  XII:  Tongue protrudes in the midline.  - Motor:  Strength is 5/5 throughout.  There is no pronator drift.  Normal muscle bulk and tone throughout.  - Reflexes:  2+ and symmetric at the biceps, triceps, brachioradialis, knees, and ankles.  Plantar responses flexor.  - Sensory:  Intact to light touch, pin prick, vibration, and joint-position sense throughout.  - Coordination:  Finger-nose-finger and heel-knee-shin intact without dysmetria.  Rapid alternating hand movements intact.  - Gait:   Normal steps, base, arm swing, and turning.      LABS:                        11.9   7.2   )-----------( 202      ( 09 Mar 2017 13:13 )             34.9     139    |  106    |  4      ----------------------------<  124    3.7     |  23     |  0.34     Ca    8.0        09 Mar 2017 13:13    TPro  6.4    /  Alb  2.2    /  TBili  0.3    /  DBili  x      /  AST  26     /  ALT  33     /  AlkPhos  88     09 Mar 2017 13:13    PT/INR - ( 09 Mar 2017 13:13 )   PT: 10.5 sec;   INR: 0.95       PTT - ( 09 Mar 2017 13:13 )  PTT:27.1 sec    IMPRESSION & PLAN:    Ms. Pablo is a 42-year-old RHF, , with a longstanding history of headaches, POD #4 from  due to preeclampsia, with intractable headaches.  Headaches are severe, unilateral, and pulsatile meeting criteria for common migraine, presently in status migrainosus.  Neurologic exam is normal and fundoscopic exam shows sharp disc margins.    1) Recommend MRI/MRA/MRV Brain without contrast to rule out posterior reversible encephalopathy syndrome (PRES), reversible cerebral vasoconstriction syndrome (RCVS), and cerebral venous sinus thrombosis (CVST).  2) Migraine cocktail -- NS 1 L IV bolus + Reglan 10 mg IV x1 + Decadron 10 mg IV x1 + Magnesium sulfate 2 g IV x1 -- given as simultaneously as possible to abort migraine.  3) Start Magnesium oxide 400 mg PO BID.  4) Will follow.

## 2017-03-10 NOTE — PROGRESS NOTE ADULT - PROBLEM SELECTOR PLAN 1
Patient is a 42 year old female with preeclampsia. Patient's blood pressure has improved significantly.     P - would continue with labetalol 400mg q8h   continue with nifedipine 30mg qdaily   Monitor blood pressure closely   Please avoid NSAIDs  If there are any questions or concern please feel free to call the renal fellow on call Patient is a 42 year old female with preeclampsia. Patient's blood pressure has improved significantly.     P - would continue with labetalol 400mg q8h   continue with nifedipine 30mg qdaily   Monitor blood pressure closely   If needed can increase nifedipine to 60mg qdaily if systolic BP > 160  Please avoid NSAIDs  If there are any questions or concern please feel free to call the renal fellow on call

## 2017-03-11 VITALS
RESPIRATION RATE: 18 BRPM | TEMPERATURE: 98 F | SYSTOLIC BLOOD PRESSURE: 139 MMHG | OXYGEN SATURATION: 98 % | HEART RATE: 78 BPM | DIASTOLIC BLOOD PRESSURE: 78 MMHG

## 2017-03-11 LAB — SURGICAL PATHOLOGY STUDY: SIGNIFICANT CHANGE UP

## 2017-03-11 PROCEDURE — 99232 SBSQ HOSP IP/OBS MODERATE 35: CPT

## 2017-03-11 RX ORDER — LABETALOL HCL 100 MG
2 TABLET ORAL
Qty: 180 | Refills: 0
Start: 2017-03-11

## 2017-03-11 RX ORDER — OXYCODONE AND ACETAMINOPHEN 5; 325 MG/1; MG/1
1 TABLET ORAL
Qty: 20 | Refills: 0
Start: 2017-03-11

## 2017-03-11 RX ORDER — NIFEDIPINE 30 MG
1 TABLET, EXTENDED RELEASE 24 HR ORAL
Qty: 60 | Refills: 0
Start: 2017-03-11

## 2017-03-11 RX ADMIN — Medication 30 MILLIGRAM(S): at 06:41

## 2017-03-11 RX ADMIN — Medication 1 TABLET(S): at 13:23

## 2017-03-11 RX ADMIN — Medication 400 MILLIGRAM(S): at 16:10

## 2017-03-11 RX ADMIN — MAGNESIUM OXIDE 400 MG ORAL TABLET 400 MILLIGRAM(S): 241.3 TABLET ORAL at 07:15

## 2017-03-11 RX ADMIN — Medication 325 MILLIGRAM(S): at 13:23

## 2017-03-11 RX ADMIN — HEPARIN SODIUM 5000 UNIT(S): 5000 INJECTION INTRAVENOUS; SUBCUTANEOUS at 07:16

## 2017-03-11 RX ADMIN — Medication 400 MILLIGRAM(S): at 06:35

## 2017-03-11 NOTE — PROGRESS NOTE ADULT - PROBLEM SELECTOR PLAN 1
Patient is a 42 year old female with preeclampsia. Patient's blood pressure has improved significantly but still intermittent SBP readings of 150.      P - would continue with labetalol 400mg q8h   However, would recommend nifedipine 30 mg q12.  Patient advised to check BP at night and if SBP >140 then she is to take the nighttime dose of nifedipine.    Please avoid NSAIDs  If there are any questions or concern please feel free to call the renal fellow on call.  In addition, patient told to seek medical attention if SBP persistently over 140-150.  She is also to see OB/GYN and internist this upcoming week.

## 2017-03-11 NOTE — PROGRESS NOTE ADULT - ASSESSMENT
41 yo  s/p CS POD1 with severe range BP and headache, previously gHTN, now meets criteria for PEC with severe features  - 20 mg Labetalol IVP administered  - recheck BP in 15 minutes  - full PEC labs sent  - transfer back to L+D for IV magnesium  - monitor vitals, PEC symptoms, labs q6  -
43 y/o , POD3 after repeat  delivery complicated by preeclampsia with severe features   PEC: patient required IV antihypertensives overnight. Currently on Labetalol 200mg TID. Patient currently asymptomatic. Labs within normal limits. Will continue to monitor closely.       1. Pain: controlled with OPM.   2. GI: + flatus, tolerating a regular diet without issue   3. : voiding   4. DVT prophylaxis: ambulation &SQH
43 y/o , POD4 after repeat  delivery complicated by preeclampsia with severe features   PEC: patient required IV antihypertensives overnight. Currently on Labetalol 400mg TID and nifideipine 30XL daily.  Patient currently asymptomatic. Labs within normal limits. Will continue to monitor closely.   1. Pain: controlled with OPM.   2. GI: + flatus, tolerating a regular diet without issue   3. : voiding   4. DVT prophylaxis: ambulation &SQH
43 y/o , POD5 after repeat  delivery complicated by preeclampsia with severe features   PEC: patient required IV antihypertensives overnight. Currently on Labetalol 400mg TID and nifideipine 30XL daily.  Patient currently asymptomatic. S/p Neurology consult and MRI/MRA/MRV that returned within normal limits.  Headache currently resolved s/p "headache" cocktail.  continue to follow BPs and consider dispo home with close follow-up and oral antihypertensives    1. Pain: controlled with OPM. Headache cocktail - Reglan 10mg, Decadron 10mg,   2. GI: + flatus, tolerating a regular diet without issue   3. : voiding   4. DVT prophylaxis: ambulation &SQH
43 y/o POD1 after  delivery. Postoperative course complicated by gestaitona hypertension. full labs negative. No toxic complaints. Will continue to monitor closely   1. Pain: controlled with OPM.   2. GI: + flatus, tolerating a regular diet without issue   3. : voiding   4. DVT prophylaxis: ambulation  5. Dispo: POD3
A&P: 42y Female   s/p CS POD2 complicated by preeclampsia with severe features  - Preeclampsia: Continue IV Magnesium @2G/hr for 24 hrs post delivery. Magnesium clinical checks and serum assay q6 hrs. No complaints currently. Consider oral BP medication if hypertension persists s/p mg  - VTE: SCDs, SQH if indicated.   - GI: Diet: clears as tolerated if not nauseous  - Neuro: Oral pain medications as needed: hold NSAIDs  - : strict Is and Os, D/C jaramillo after discontinuation of IV Magnesium  - Discharge planning: discussed importance of BP check at home, will be evaluated by PP nursing re need for VNS. Discussed close follow up with OB within 1-2 wks. Reviewed toxic complaints with patient.
A&P: 42y Female   s/p stat C/S complicated by preeclampsia with severe features  1. Preeclampsia: Continue IV Magnesium @2G/hr for 24 hrs post delivery. Magnesium clinical checks and serum assay q6 hrs. No complaints currently. BP had to be controlled with a IVP of hydralizine at 710pm tonight for severe BPs. Pt currently not on any oral hypertensives. Add if needed  2. VTE: SCDs, SQH if indicated.   3. GI: Diet: clears as tolerated if not nauseous  4. Neuro: Oral pain medications as needed: hold NSAIDs  5. : strict Is and Os, D/C jaramillo after discontinuation of IV Magnesium  6. Discharge planning: discussed importance of BP check at home, will be evaluated by PP nursing re need for VNS. Discussed close follow up with OB within 1-2 wks. Reviewed toxic complaints with patient.
A&P: 42y Female   s/p stat C/S complicated by preeclampsia with severe features  1. Preeclampsia: Continue IV Magnesium @2G/hr for 24 hrs post delivery. Magnesium clinical checks and serum assay q6 hrs. No complaints currently. BP had to be controlled with a IVP of hydralizine at 710pm tonight for severe BPs. Pt currently not on any oral hypertensives. Add if needed  2. VTE: SCDs, SQH if indicated.   3. GI: Diet: clears as tolerated if not nauseous  4. Neuro: Oral pain medications as needed: hold NSAIDs  5. : strict Is and Os, pt voiding   6. Discharge planning: discussed importance of BP check at home, will be evaluated by PP nursing re need for VNS. Discussed close follow up with OB within 1-2 wks. Reviewed toxic complaints with patient.
A/P  yo 42y G  P   s/p c/s, POD # 2 ,with repeat severe range BP after hydralizine pushes  1. Pain:opm  2. GI:reg  3. : voiding  4.pushed IV. Labetalol 20mg now, will change 200mg Labetalol BID to TID, next dose 600  Repeat BP 15 min in mild range (156/83). Repeat at 130am. Repeat full labs now  5. Dispo: depending on BP status
A/P  yo 42y G  P   s/p c/s, POD # 2 ,with severe range BP  1. Pain:opm  2. GI:reg  3. : voiding  4.pushed IV. Hydralazine 10mg now, will start 200mg Labetalol BID  Repeat BP IN 15min  5. Dispo:
Patient is a 42 year old female with preeclampsia
Patient is a 42 year old female with preeclampsia

## 2017-03-11 NOTE — DISCHARGE NOTE OB - CARE PROVIDER_API CALL
Astrid Sims), [facility] Obstetrics [facility] Gynecology  [address] [place] [address]  Phone: [phone]  Fax: [phone]

## 2017-03-11 NOTE — DISCHARGE NOTE OB - PATIENT PORTAL LINK FT
“You can access the [facility] Patient Portal, offered by [facility], by registering with the following website: [email or web address]”

## 2017-03-11 NOTE — PROGRESS NOTE ADULT - ATTENDING COMMENTS
Patient POD #4 after a primary Cd for nonreassuring fhr, remote from delivery,   pnc and postpartum complicated with pregestational diabetes (will do Hga1c and fs fasting and 2 hours postprandial)  --s/p magnesium for seizures prophylaxis  --on labetalol 400 mgs TID and nifedipine 30 xl, at 9:30 she was c/o ha, recommend tylenol, denied blurry vision, RUQ or epigastric pain, s/p nephrology consult, to continue present management  will continue pp care  pain management prn  monitor vitals as per protocol  dvt prophylaxis  monitor FS and Hga1c
Patient to have follow-up visit to her PCP for BP next week.

## 2017-03-11 NOTE — DISCHARGE NOTE OB - ADDITIONAL INSTRUCTIONS
BP medications instructions  take labetalol 400 orally 3 times a day   Take nifedipine 30 XL twice daily (once in the morning, and once at bedtime is BP over 120/80)

## 2017-03-11 NOTE — DISCHARGE NOTE OB - CARE PLAN
Principal Discharge DX:	Pre-eclampsia, unspecified trimester  Goal:	stabilization of blood pressure  Instructions for follow-up, activity and diet:	Follow up with your OB within one week for a BP check. Schedule a f/u appointment for 2 weeks. Check bp 3x a day. If bp >160/110, you develop a headache not relieved by tylenol, visual disturbances, or right upper abdominal pain, call your doctor or the hospital, or go to your nearest emergency room. Regular diet, no heavy lifting for 2 weeks, pelvic rest.

## 2017-03-11 NOTE — DISCHARGE NOTE OB - HOSPITAL COURSE
Patient had nonremitting headaches. Neurology consultred and MRI/MRV/MRA performed with negative workup. On day of discharge patient reports headache is improved. Pt ambulating and tolerating PO at the time of discharge. Patient's blood pressure stabilized on labetalol 400mg TID and nifedipine 30XL daily. Patient to be discharged dale on these medications and with a Rx for a BP cuff to monitor her BPs at home. She will follow up with OB within 1 week for BP check

## 2017-03-11 NOTE — DISCHARGE NOTE OB - PLAN OF CARE
Follow up with your OB within one week for a BP check. Schedule a f/u appointment for 2 weeks. Check bp 3x a day. If bp >160/110, you develop a headache not relieved by tylenol, visual disturbances, or right upper abdominal pain, call your doctor or the hospital, or go to your nearest emergency room. Regular diet, no heavy lifting for 2 weeks, pelvic rest. stabilization of blood pressure

## 2017-03-11 NOTE — PROGRESS NOTE ADULT - SUBJECTIVE AND OBJECTIVE BOX
At bedside for severe range BP x2, pushed IV hydralazine 5mg.  Notes slight improvement of headache, no discomfort while laying flat in bed, still feels a bit woozy on magnesium.  Denies scotoma/RUQ pain. Resting in bed.    Will assess response in 15 minutes.  Continue IV magnesium.
At bedside to assess patient due to headache and severe range blood pressures x2.  /88>163/87.      Has had frontal headache and neck pain starting  this morning that was improved with laying flat, worse sitting up and standing.  Slightly improved with dose of fioricet but still feels mild headache.  Denies scotoma/blurry vision, no RUQ pain/epigastric discomfort.  Otherwise no complaints, ambulating, pain well controlled.      Focused physical exam    ICU Vital Signs Last 24 Hrs  T(C): 36.7, Max: 37.2 (03-06 @ 21:37)  T(F): 98.1, Max: 98.9 (03-06 @ 21:37)  HR: 84 (70 - 86)  BP: 159/85 (135/87 - 167/95)  BP(mean): --  ABP: --  ABP(mean): --  RR: 18 (16 - 18)  SpO2: 97% (97% - 98%)    Abd soft nontender  Ext +1 edema, reflexes +2
Called to bedside by nurse due to severe range blood pressure and acute onset of nausea.  Repeat BP WNL though still moderate range.  The patient states that she is feeling unwell and has been dry heaving, no emesis.  She also notes chest tightness and some difficulty taking deep breaths.  Not using incentive spirometer.  She denies any headache/blurry vision/RUQ pain.      Objective:  /105  HR 97  Pulses equal bilaterally, RRR  Lungs CTAB, no adventitious sounds, good effort    A+P  - STAT EKG ordered  - LE dopplers  - full labs  - increase labetalol to 300 TID  - add nifedipine 30mgXL  - renal consult for management of ongoing hypertension
NEUROLOGY FOLLOW-UP CONSULT NOTE    CHIEF COMPLAINT:  Headaches    INTERVAL HISTORY:  Patient seen and examined today at the bedside. MRI/MRA/MRV performed last night. She is feeling better after migraine cocktail, but still with mild right-sided headache.     REVIEW OF SYSTEMS:  As per HPI, otherwise negative for Constitutional, Eyes, Ears/Nose/Mouth/Throat, Neck, Cardiovascular, Respiratory, Gastrointestinal, Genitourinary, Skin, Endocrine, Musculoskeletal, Psychiatric, and Hematologic/Lymphatic.    MEDICATIONS:  naloxone Injectable 0.1milliGRAM(s) IV Push every 3 minutes PRN  ondansetron Injectable 4milliGRAM(s) IV Push every 6 hours PRN  acetaminophen   Tablet. 650milliGRAM(s) Oral every 6 hours PRN  acetaminophen   Tablet 650milliGRAM(s) Oral every 6 hours PRN  oxyCODONE  5 mG/acetaminophen 325 mG 1Tablet(s) Oral every 3 hours PRN  simethicone 80milliGRAM(s) Chew every 4 hours PRN  diphenhydrAMINE   Capsule 25milliGRAM(s) Oral every 6 hours PRN  glycerin Suppository - Adult 1Suppository(s) Rectal at bedtime PRN  docusate sodium 100milliGRAM(s) Oral two times a day PRN  lanolin Ointment 1Application(s) Topical every 3 hours PRN  acetaminophen/butalbital/caffeine 1Tablet(s) Oral every 6 hours PRN  hydrocortisone 1% Cream 1Application(s) Topical every 4 hours PRN  ferrous sulfate 325milliGRAM(s) Oral daily  prenatal multivitamin 1Tablet(s) Oral daily  heparin  Injectable 5000Unit(s) SubCutaneous every 12 hours  Nifedipine XL 30milliGRAM(s) Oral daily  Labetalol 400milliGRAM(s) Oral every 8 hours  magnesium oxide 400milliGRAM(s) Oral every 12 hours    VITAL SIGNS:  Vital Signs Last 24 Hrs  T(C): 36.8, Max: 37.2 (03-10 @ 20:10)  T(F): 98.3, Max: 99 (03-10 @ 20:10)  HR: 77 (77 - 90)  BP: 125/75 (125/75 - 154/88)  RR: 18 (16 - 18)  SpO2: 98% (96% - 98%)    PHYSICAL EXAMINATION:  General: Well-developed, well nourished, in no acute distress.  Eyes: Conjunctiva and sclera clear.  Cardiovascular: 2+ distal pulses.  Neurologic:  - Mental Status:  Alert, awake, oriented to person, place, and time; Speech is fluent; Good overall fund of knowledge.  - Cranial Nerves II-XII:    II:  Visual fields are full to confrontation; Fundoscopic exam is normal with sharp discs; Pupils are equal, round, and reactive to light.  III, IV, VI:  Extraocular movements are intact without nystagmus.  V:  Facial sensation is intact in the V1-V3 distribution bilaterally.  VII:  Face is symmetric with normal eye closure and smile  VIII:  Hearing is intact to finger rub.  IX, X:  Uvula is midline and soft palate rises symmetrically  XI:  Head turning and shoulder shrug are intact.  XII:  Tongue protrudes in the midline.  - Motor:  Strength is 5/5 throughout.  There is no pronator drift.  Normal muscle bulk and tone throughout.  - Reflexes:  2+ and symmetric at the biceps, triceps, brachioradialis, knees, and ankles.  Plantar responses flexor.  - Sensory:  Intact to light touch, pin prick, vibration, and joint-position sense throughout.  - Coordination:  Finger-nose-finger and heel-knee-shin intact without dysmetria.  Rapid alternating hand movements intact.  - Gait:   Normal steps, base, arm swing, and turning.      LABS:                        11.9   7.2   )-----------( 202      ( 09 Mar 2017 13:13 )             34.9     139    |  106    |  4      ----------------------------<  124    3.7     |  23     |  0.34     Ca    8.0        09 Mar 2017 13:13    TPro  6.4    /  Alb  2.2    /  TBili  0.3    /  DBili  x      /  AST  26     /  ALT  33     /  AlkPhos  88     09 Mar 2017 13:13    PT/INR - ( 09 Mar 2017 13:13 )   PT: 10.5 sec;   INR: 0.95       PTT - ( 09 Mar 2017 13:13 )  PTT:27.1 sec    RADIOLOGY & ADDITIONAL STUDIES:      03/10/2017 MRI Brain w/o contrast:  Punctate focus of increased signal within the periventricular white matter of the right frontal lobe which is nonspecific. A small perivascular space is noted in the right basal ganglia. No acute ischemia. No intracranial hemorrhage.    03/10/2017 MRA Head w/o contrast:  Normal.    03/10/2017 MRV Head w/o contrast:  Normal.    IMPRESSION & PLAN:    Ms. Pablo is a 42-year-old right-handed woman with a longstanding history of headaches,  POD #5 from  due to preeclampsia, with status migrainosus.  Neurologic exam today remains normal. MRI/MRA/MRV Brain without contrast unremarkable.      1) Continue Magnesium oxide 400 mg PO BID and start Riboflavin 400 mg PO daily for migraine prophylaxis.  2) Use Excedrin Migraine sparingly as migraine abortive.  3) Encouraged to maintain migraine diary.  4) Follow-up in Neurology clinic with Dr. Trejo in 1-2 weeks.  5) Please consult with additional questions
Overnight patient with severe range blood pressures requiring 2 pushes of hydralazine 5,10mg and then 20mg of labetalol before appropriate control. Patient started on labetalol 200mg TID.     Currently, patient feels much improved. During hypertensive episode, she felt a headache radiating from the top of her head to the base of her skull. currently, she only feels pain at the back of her head, no neck stiffness. No blurry vision or donald headache. No chest pain, SOB, nausea, or vomiting or RUQ pain.     She has been ambulating without assistance, voiding spontaneously, passing gas, tolerating regular diet and is breastfeeding.  She reports pain is well controlled with motrin and percocet.       Physical Exam:  Vital Signs Last 24 Hrs  T(C): 36.8, Max: 37.3 (03-08 @ 18:05)  T(F): 98.2, Max: 99.2 (03-08 @ 18:05)  HR: 93 (83 - 118)  BP: 149/76 (118/88 - 186/106)  BP(mean): 99 (87 - 103)  RR: 16 (16 - 18)  SpO2: 96% (95% - 100%)    Constitutional: Alert & Oriented x3, No acute distress, cooperative   Gastrointestinal: soft, non-tender, positive bowel sounds, no rebound or guarding; uterus firm at midline,  2 fb below umbilicus  Incision: steristrips in place; area clean, dry and intact; no erythema or induration   :  lochia WNL   Extremities: no calf tenderness or swelling                          12.3   7.7   )-----------( 186      ( 09 Mar 2017 01:45 )             36.6     09 Mar 2017 01:45    140    |  105    |  4      ----------------------------<  143    3.5     |  26     |  0.39     Ca    7.4        09 Mar 2017 01:45  Mg     5.2       08 Mar 2017 13:20    TPro  6.4    /  Alb  2.1    /  TBili  0.8    /  DBili  x      /  AST  26     /  ALT  25     /  AlkPhos  93     09 Mar 2017 01:45      PT/INR - ( 09 Mar 2017 01:45 )   PT: 10.3 sec;   INR: 0.93          PTT - ( 09 Mar 2017 01:45 )  PTT:26.0 sec
Patient evaluated at bedside due to repeat severe range /109 after 5 hydralazine push and 200PO labetalol administration.   s/p MG   She denies epigastric pain/visual change. States she doesnt have an headache now  She reports pain is well controlled with  OPM  She denies headache, dizziness, chest pain, palpitations, shortness of breathe, nausea, vomiting or heavy vaginal bleeding.      Physical Exam:  Vital Signs Last 24 Hrs  T(C): 37.3, Max: 37.4 (03-08 @ 02:00)  T(F): 99.2, Max: 99.3 (03-08 @ 02:00)  HR: 96 (83 - 118)  BP: 156/83 (118/88 - 186/106)  BP(mean): 99 (87 - 103)  RR: 18 (18 - 18)  SpO2: 95% (95% - 100%)    GA: NAD, A+0 x 3  CV: RRR  Pulm: CTAB  Breasts: soft, nontender, no palpable masses  Abd: + BS, soft, nontender, nondistended, no rebound or guarding, incision clean, dry and intact, uterus firm at midline, 1 fb below umbilicus  : lochia WNL  Extremities: no swelling or calf tenderness, reflexes +2 bilaterally                            12.2   7.8   )-----------( 156      ( 08 Mar 2017 07:14 )             35.7     07 Mar 2017 06:03    139    |  108    |  9      ----------------------------<  87     4.2     |  23     |  0.46     Ca    8.0        07 Mar 2017 06:03  Mg     5.2       08 Mar 2017 13:20    TPro  5.5    /  Alb  1.9    /  TBili  0.3    /  DBili  x      /  AST  21     /  ALT  12     /  AlkPhos  90     07 Mar 2017 06:03    Magnesium, Serum: 5.2 mg/dL (03-08 @ 13:20)    PT/INR - ( 07 Mar 2017 12:47 )   PT: 9.8 sec;   INR: 0.88          PTT - ( 07 Mar 2017 12:47 )  PTT:25.2 sec
Patient evaluated at bedside due to severe range /101  s/p MG   She denies epigastric pain/visual change. mild Headache  She reports pain is well controlled with  OPM  She denies headache, dizziness, chest pain, palpitations, shortness of breathe, nausea, vomiting or heavy vaginal bleeding.      Physical Exam:  Vital Signs Last 24 Hrs  T(C): 37.3, Max: 37.4 (03-08 @ 02:00)  T(F): 99.2, Max: 99.3 (03-08 @ 02:00)  HR: 86 (83 - 104)  BP: 169/91 (118/88 - 169/91)  BP(mean): 99 (87 - 103)  RR: 18 (18 - 18)  SpO2: 95% (95% - 100%)    GA: NAD, A+0 x 3  CV: RRR  Pulm: CTAB  Breasts: soft, nontender, no palpable masses  Abd: + BS, soft, nontender, nondistended, no rebound or guarding, incision clean, dry and intact, uterus firm at midline,  fb below umbilicus  : lochia WNL  Extremities: no swelling or calf tenderness, reflexes +2 bilaterally                            12.2   7.8   )-----------( 156      ( 08 Mar 2017 07:14 )             35.7     07 Mar 2017 06:03    139    |  108    |  9      ----------------------------<  87     4.2     |  23     |  0.46     Ca    8.0        07 Mar 2017 06:03  Mg     5.2       08 Mar 2017 13:20    TPro  5.5    /  Alb  1.9    /  TBili  0.3    /  DBili  x      /  AST  21     /  ALT  12     /  AlkPhos  90     07 Mar 2017 06:03    Magnesium, Serum: 5.2 mg/dL (03-08 @ 13:20)    PT/INR - ( 07 Mar 2017 12:47 )   PT: 9.8 sec;   INR: 0.88          PTT - ( 07 Mar 2017 12:47 )  PTT:25.2 sec
Patient evaluated at bedside.   She reports pain is well controlled with OPM.  She has been ambulating without assistance, voiding spontaneously, passing gas, tolerating regular diet and is breastfeeding.  Patient had MRI/MRA/MRV last night without complicatons  Patient denies headache this morning or any other toxic complaints.      Physical Exam:  Vital Signs Last 24 Hrs  T(C): 36.7, Max: 37.2 (03-10 @ 20:10)  T(F): 98.1, Max: 99 (03-10 @ 20:10)  HR: 85 (81 - 90)  BP: 154/88 (125/80 - 154/88)  BP(mean): --  RR: 18 (16 - 18)  SpO2: 98% (96% - 98%)    Gen: NAD  Abd: + BS, soft, nontender, nondistended, no rebound or guarding  Incision clean, dry and intact  uterus firm at midline  : lochia WNL  Extremities: no swelling or calf tenderness                          11.9   7.2   )-----------( 202      ( 09 Mar 2017 13:13 )             34.9     09 Mar 2017 13:13    139    |  106    |  4      ----------------------------<  124    3.7     |  23     |  0.34     Ca    8.0        09 Mar 2017 13:13    TPro  6.4    /  Alb  2.2    /  TBili  0.3    /  DBili  x      /  AST  26     /  ALT  33     /  AlkPhos  88     09 Mar 2017 13:13      PT/INR - ( 09 Mar 2017 13:13 )   PT: 10.5 sec;   INR: 0.95          PTT - ( 09 Mar 2017 13:13 )  PTT:27.1 sec
Patient evaluated at bedside. Patient has continued elevated blood pressures without toic complaints. Before magnesium, patient was ambulating without difficulty. Pain controlled with motrin and percocet. Clears diet without issue.  She denies headache, dizziness, chest pain, palpitations, shortness of breathe, nausea, vomiting or heavy vaginal bleeding.      Physical Exam:  Vital Signs Last 24 Hrs  T(C): 37.4, Max: 37.4 (03-08 @ 02:00)  T(F): 99.3, Max: 99.3 (03-08 @ 02:00)  HR: 93 (79 - 104)  BP: 145/73 (143/58 - 173/83)  BP(mean): --  RR: 18 (18 - 20)  SpO2: 98% (95% - 98%)    Constitutional: Alert & Oriented x3, No acute distress, cooperative   Gastrointestinal: soft, mildy tender, positive bowel sounds, no rebound or guarding; uterus firm at midline, 2 fb below umbilicus  Incision: steristrips in place; area clean, dry and intact; no erythema or induration   : lochia WNL   Extremities: no calf tenderness or swelling                          11.6   7.7   )-----------( 132      ( 07 Mar 2017 12:47 )             34.6     07 Mar 2017 06:03    139    |  108    |  9      ----------------------------<  87     4.2     |  23     |  0.46     Ca    8.0        07 Mar 2017 06:03  Mg     4.5       08 Mar 2017 00:33    TPro  5.5    /  Alb  1.9    /  TBili  0.3    /  DBili  x      /  AST  21     /  ALT  12     /  AlkPhos  90     07 Mar 2017 06:03    Magnesium, Serum: 4.5 mg/dL (03-08 @ 00:33)  Magnesium, Serum: 3.8 mg/dL (03-07 @ 20:28)    PT/INR - ( 07 Mar 2017 12:47 )   PT: 9.8 sec;   INR: 0.88          PTT - ( 07 Mar 2017 12:47 )  PTT:25.2 sec
Patient evaluated at bedside. She has been ambulating without assistance, voiding spontaneously, passing gas, tolerating regular diet and is breastfeeding.  She reports pain is well controlled with motrin.   She denies headache, dizziness, chest pain, palpitations, shortness of breathe, nausea, vomiting or heavy vaginal bleeding.      Physical Exam:  Vital Signs Last 24 Hrs  T(C): 36.9, Max: 37.2 (03-06 @ 21:37)  T(F): 98.4, Max: 98.9 (03-06 @ 21:37)  HR: 73 (62 - 74)  BP: 135/87 (135/87 - 149/70)  BP(mean): --  RR: 16 (15 - 18)  SpO2: 98% (94% - 100%)    Constitutional: Alert & Oriented x3, No acute distress, cooperative   Gastrointestinal: soft, mildly tender, positive bowel sounds, no rebound or guarding; uterus firm at midline, 2 fb below umbilicus  Incision: steristrips in place; area clean, dry and intact; no erythema or induration   : lochia WNL   Extremities: no calf tenderness or swelling                          11.2   7.5   )-----------( 121      ( 07 Mar 2017 06:03 )             33.7     07 Mar 2017 06:03    139    |  108    |  9      ----------------------------<  87     4.2     |  23     |  0.46     Ca    8.0        07 Mar 2017 06:03    TPro  5.5    /  Alb  1.9    /  TBili  0.3    /  DBili  x      /  AST  21     /  ALT  12     /  AlkPhos  90     07 Mar 2017 06:03
Patient evaluated at bedside. She has been ambulating without assistance, voiding spontaneously, passing gas, tolerating regular diet and is breastfeeding.  She reports pain is well controlled with percocet  Patient with continued elevated blood pressures overnight, but she did not require any pushes of antihypertensives.   She denies headache, dizziness, chest pain, palpitations, shortness of breathe, nausea, vomiting or heavy vaginal bleeding.      Physical Exam:  Vital Signs Last 24 Hrs  T(C): 36.6, Max: 37.3 (03-09 @ 22:06)  T(F): 97.9, Max: 99.2 (03-09 @ 22:06)  HR: 89 (84 - 98)  BP: 124/80 (117/73 - 165/93)  BP(mean): --  RR: 18 (18 - 20)  SpO2: 96% (95% - 99%)    Constitutional: Alert & Oriented x3, No acute distress, cooperative   Gastrointestinal: obese, soft, mildly tender, positive bowel sounds, no rebound or guarding; uterus firm at midline, 2 fb below umbilicus  Incision: steristrips in place; area clean, dry and intact; no erythema or induration   : lochia WNL   Extremities:  no calf tenderness or swelling                          11.9   7.2   )-----------( 202      ( 09 Mar 2017 13:13 )             34.9     09 Mar 2017 13:13    139    |  106    |  4      ----------------------------<  124    3.7     |  23     |  0.34     Ca    8.0        09 Mar 2017 13:13  Mg     5.2       08 Mar 2017 13:20    TPro  6.4    /  Alb  2.2    /  TBili  0.3    /  DBili  x      /  AST  26     /  ALT  33     /  AlkPhos  88     09 Mar 2017 13:13      PT/INR - ( 09 Mar 2017 13:13 )   PT: 10.5 sec;   INR: 0.95          PTT - ( 09 Mar 2017 13:13 )  PTT:27.1 sec
Patient seen and examined by me at bedside.  ROS: No chest pain, no sob, no abd pain. No n/v/d      PAST MEDICAL & SURGICAL HISTORY:    PHYSICAL EXAM:  T(C): 36.8, Max: 37.2 (03-10 @ 20:10)  HR: 77  BP: 125/75 (125/75 - 154/88)  RR: 18  SpO2: 98%  Wt(kg): --    Weight 83.9 (03-06 @ 02:18)  General: AAO x 3,  NAD.  HEENT: moist mucous membranes, no pallor/cyanosis.  Neck: no JVD visible.  Cardiac: S1, S2. RRR. No murmurs   Respratory: CTA b/l, no access muscle use.   Abdomen: soft. nontender. nondistended  Skin: no rashes.  Extremities: no LE edema b/l      DATA:            TPro  6.4 g/dL  /  Alb  2.2 g/dL<L>  /  TBili  0.3 mg/dL  /  DBili  x      /  AST  26 U/L  /  ALT  33 U/L  /  AlkPhos  88 U/L  09 Mar 2017 13:13                    MEDICATIONS  (STANDING):  diphtheria/tetanus/pertussis (acellular) Vaccine (ADAcel) 0.5milliLiter(s) IntraMuscular once  ferrous    sulfate 325milliGRAM(s) Oral daily  prenatal multivitamin 1Tablet(s) Oral daily  heparin  Injectable 5000Unit(s) SubCutaneous every 12 hours  hydrALAZINE Injectable 10milliGRAM(s) IV Push once  hydrALAZINE Injectable 5milliGRAM(s) IV Push once  labetalol Injectable 20milliGRAM(s) IV Push once  NIFEdipine XL 30milliGRAM(s) Oral daily  labetalol 400milliGRAM(s) Oral every 8 hours  dextrose 5%. 1000milliLiter(s) IV Continuous <Continuous>  dextrose 50% Injectable 12.5Gram(s) IV Push once  dextrose 50% Injectable 25Gram(s) IV Push once  dextrose 50% Injectable 25Gram(s) IV Push once  dexamethasone  IVPB 10milliGRAM(s) IV Intermittent once  metoclopramide Injectable 10milliGRAM(s) IV Push once  lactated ringers. 500milliLiter(s) IV Continuous <Continuous>  lactated ringers. 1000milliLiter(s) IV Continuous <Continuous>  magnesium oxide 400milliGRAM(s) Oral every 12 hours    MEDICATIONS  (PRN):  naloxone Injectable 0.1milliGRAM(s) IV Push every 3 minutes PRN For ANY of the following changes in patient status:  A. RR LESS THAN 10 breaths per minute, B. Oxygen saturation LESS THAN 90%, C. Sedation score of 6  ondansetron Injectable 4milliGRAM(s) IV Push every 6 hours PRN Nausea  acetaminophen   Tablet. 650milliGRAM(s) Oral every 6 hours PRN Mild Pain (1 - 3)  acetaminophen   Tablet 650milliGRAM(s) Oral every 6 hours PRN For Temp greater than 38.5 C (101.3 F)  oxyCODONE  5 mG/acetaminophen 325 mG 1Tablet(s) Oral every 3 hours PRN Moderate Pain  oxyCODONE  5 mG/acetaminophen 325 mG 2Tablet(s) Oral every 6 hours PRN Severe Pain  simethicone 80milliGRAM(s) Chew every 4 hours PRN Gas  diphenhydrAMINE   Capsule 25milliGRAM(s) Oral every 6 hours PRN Itching  glycerin Suppository - Adult 1Suppository(s) Rectal at bedtime PRN Constipation  docusate sodium 100milliGRAM(s) Oral two times a day PRN Stool Softening  lanolin Ointment 1Application(s) Topical every 3 hours PRN Sore Nipples  acetaminophen/butalbital/caffeine 1Tablet(s) Oral every 6 hours PRN headache  hydrocortisone 1% Cream 1Application(s) Topical every 4 hours PRN Rash  dextrose Gel 1Dose(s) Oral once PRN Blood Glucose LESS THAN 70 milliGRAM(s)/deciliter  glucagon  Injectable 1milliGRAM(s) IntraMuscular once PRN Glucose LESS THAN 70 milligrams/deciliter
S: Pt evaluated at bedside for magnesium check. Feels significant improvement of spinal headache over the past few hours, can get out of bed without issue.  She denies visual disturbances, headache and right upper quadrant pain. Also denies nausea/vomiting/epigastric pain/shortness of breath. Tolerating clear fluid.  Pain well controlled.     O:  T(C): 36.9, Max: 36.9 (03-08 @ 12:00)  HR: 89 (83 - 98)  BP: 153/76 (118/88 - 166/89); last severe range at 6:30 am  RR: 18 (18 - 18)  SpO2: 100% (100% - 100%)  Wt(kg): --  Daily     Daily     I & Os for current day (as of 03-08 @ 14:35)  =============================================  IN: 1200 ml / OUT: 3150 ml / NET: -1950 ml    Gen: NAD, AAOx3  Pulm: CTAB, no R/R/W  Abd: soft, nontender, no rebound or guarding, no epigastric tenderness, liver nonpalpable +BS.    Ext: +1 edema lorne, SCDs in place, Reflexes wnl                          12.2   7.8   )-----------( 156      ( 08 Mar 2017 07:14 )             35.7     07 Mar 2017 06:03    139    |  108    |  9      ----------------------------<  87     4.2     |  23     |  0.46     Ca    8.0        07 Mar 2017 06:03  Mg     5.2       08 Mar 2017 13:20    TPro  5.5    /  Alb  1.9    /  TBili  0.3    /  DBili  x      /  AST  21     /  ALT  12     /  AlkPhos  90     07 Mar 2017 06:03
S: Pt evaluated at bedside for magnesium check. She denies visual disturbances, headache and right upper quadrant pain. Also denies nausea/vomiting/epigastric pain/shortness of breath. Pain well controlled.     O:  T(C): 36.2, Max: 36.7 (03-07 @ 11:30)  HR: 97 (79 - 102)  BP: 143/70 (143/58 - 173/83)  RR: 18 (18 - 20)  SpO2: 98% (95% - 98%)  Wt(kg): --  Daily     Daily     I & Os for current day (as of 03-07 @ 23:19)  =============================================  IN: 0 ml / OUT: 1250 ml / NET: -1250 ml    Gen: NAD, AAOx3  CV: RRR, no M/R/G  Pulm: CTAB, no R/R/W  Abd: soft, nontender, no rebound or guarding, no epigastric tenderness, liver nonpalpable +BS.   : Engel   Ext: +1 edema lorne, SCDs in place, Reflexes 2+    metoclopramide Injectable 10milliGRAM(s) IV Push once PRN  naloxone Injectable 0.1milliGRAM(s) IV Push every 3 minutes PRN  ondansetron Injectable 4milliGRAM(s) IV Push every 6 hours PRN  acetaminophen   Tablet. 650milliGRAM(s) Oral every 6 hours PRN  acetaminophen   Tablet 650milliGRAM(s) Oral every 6 hours PRN  oxyCODONE  5 mG/acetaminophen 325 mG 1Tablet(s) Oral every 3 hours PRN  oxyCODONE  5 mG/acetaminophen 325 mG 2Tablet(s) Oral every 6 hours PRN  simethicone 80milliGRAM(s) Chew every 4 hours PRN  diphenhydrAMINE   Capsule 25milliGRAM(s) Oral every 6 hours PRN  diphtheria/tetanus/pertussis (acellular) Vaccine (ADAcel) 0.5milliLiter(s) IntraMuscular once  oxytocin Infusion 41.667milliUNIT(s)/Min IV Continuous <Continuous>  glycerin Suppository - Adult 1Suppository(s) Rectal at bedtime PRN  docusate sodium 100milliGRAM(s) Oral two times a day PRN  lanolin Ointment 1Application(s) Topical every 3 hours PRN  ferrous    sulfate 325milliGRAM(s) Oral daily  prenatal multivitamin 1Tablet(s) Oral daily  heparin  Injectable 5000Unit(s) SubCutaneous every 12 hours  insulin lispro (HumaLOG) corrective regimen sliding scale  SubCutaneous Before meals and at bedtime  dextrose 5%. 1000milliLiter(s) IV Continuous <Continuous>  dextrose Gel 1Dose(s) Oral once PRN  dextrose 50% Injectable 12.5Gram(s) IV Push once  dextrose 50% Injectable 25Gram(s) IV Push once  dextrose 50% Injectable 25Gram(s) IV Push once  glucagon  Injectable 1milliGRAM(s) IntraMuscular once PRN  acetaminophen/butalbital/caffeine 1Tablet(s) Oral every 6 hours PRN  magnesium sulfate Infusion 2Gm/Hr IV Continuous <Continuous>  lactated ringers. 1000milliLiter(s) IV Continuous <Continuous>    No Known Allergies                            11.6   7.7   )-----------( 132      ( 07 Mar 2017 12:47 )             34.6     07 Mar 2017 06:03    139    |  108    |  9      ----------------------------<  87     4.2     |  23     |  0.46     Ca    8.0        07 Mar 2017 06:03  Mg     3.8       07 Mar 2017 20:28    TPro  5.5    /  Alb  1.9    /  TBili  0.3    /  DBili  x      /  AST  21     /  ALT  12     /  AlkPhos  90     07 Mar 2017 06:03
Patient seen and examined at bedside. Patient states she is feeling well. she complains of a headache earlier which has now improved. She denies any abdominal pain, nausea, vomiting, or blurred vision.     naloxone Injectable 0.1milliGRAM(s) every 3 minutes PRN  ondansetron Injectable 4milliGRAM(s) every 6 hours PRN  acetaminophen   Tablet. 650milliGRAM(s) every 6 hours PRN  acetaminophen   Tablet 650milliGRAM(s) every 6 hours PRN  oxyCODONE  5 mG/acetaminophen 325 mG 1Tablet(s) every 3 hours PRN  oxyCODONE  5 mG/acetaminophen 325 mG 2Tablet(s) every 6 hours PRN  simethicone 80milliGRAM(s) every 4 hours PRN  diphenhydrAMINE   Capsule 25milliGRAM(s) every 6 hours PRN  diphtheria/tetanus/pertussis (acellular) Vaccine (ADAcel) 0.5milliLiter(s) once  glycerin Suppository - Adult 1Suppository(s) at bedtime PRN  docusate sodium 100milliGRAM(s) two times a day PRN  lanolin Ointment 1Application(s) every 3 hours PRN  ferrous    sulfate 325milliGRAM(s) daily  prenatal multivitamin 1Tablet(s) daily  heparin  Injectable 5000Unit(s) every 12 hours  acetaminophen/butalbital/caffeine 1Tablet(s) every 6 hours PRN  lactated ringers. 1000milliLiter(s) <Continuous>  hydrocortisone 1% Cream 1Application(s) every 4 hours PRN  hydrALAZINE Injectable 10milliGRAM(s) once  hydrALAZINE Injectable 5milliGRAM(s) once  labetalol Injectable 20milliGRAM(s) once  NIFEdipine XL 30milliGRAM(s) daily  labetalol 400milliGRAM(s) every 8 hours  dextrose 5%. 1000milliLiter(s) <Continuous>  dextrose Gel 1Dose(s) once PRN  dextrose 50% Injectable 12.5Gram(s) once  dextrose 50% Injectable 25Gram(s) once  dextrose 50% Injectable 25Gram(s) once  glucagon  Injectable 1milliGRAM(s) once PRN    Allergies    No Known Allergies    Intolerances    T(C): , Max: 37.3 (03-09 @ 22:06)  T(F): , Max: 99.2 (03-09 @ 22:06)  HR: 81  BP: 150/88  RR: 18  SpO2: 96%    LABS:                        11.9   7.2   )-----------( 202      ( 09 Mar 2017 13:13 )             34.9     09 Mar 2017 13:13    139    |  106    |  4      ----------------------------<  124    3.7     |  23     |  0.34     Ca    8.0        09 Mar 2017 13:13  Mg     5.2       08 Mar 2017 13:20    TPro  6.4    /  Alb  2.2    /  TBili  0.3    /  DBili  x      /  AST  26     /  ALT  33     /  AlkPhos  88     09 Mar 2017 13:13    Hemoglobin A1C, Whole Blood: 8.4 % <H> [4.8 - 5.6] (03-10 @ 11:48)  Uric Acid, Serum: 4.1 mg/dL [2.6 - 6.0] (03-09 @ 13:13)    PT/INR - ( 09 Mar 2017 13:13 )   PT: 10.5 sec;   INR: 0.95       PTT - ( 09 Mar 2017 13:13 )  PTT:27.1 sec

## 2017-03-16 DIAGNOSIS — Z98.890 OTHER SPECIFIED POSTPROCEDURAL STATES: ICD-10-CM

## 2017-03-16 DIAGNOSIS — Z87.442 PERSONAL HISTORY OF URINARY CALCULI: ICD-10-CM

## 2017-03-16 DIAGNOSIS — Z3A.38 38 WEEKS GESTATION OF PREGNANCY: ICD-10-CM

## 2017-03-16 DIAGNOSIS — G43.911 MIGRAINE, UNSPECIFIED, INTRACTABLE, WITH STATUS MIGRAINOSUS: ICD-10-CM

## 2020-05-26 ENCOUNTER — APPOINTMENT (OUTPATIENT)
Dept: OTOLARYNGOLOGY | Facility: CLINIC | Age: 45
End: 2020-05-26
Payer: COMMERCIAL

## 2020-05-26 VITALS
TEMPERATURE: 99.1 F | SYSTOLIC BLOOD PRESSURE: 161 MMHG | BODY MASS INDEX: 29.83 KG/M2 | HEIGHT: 61 IN | WEIGHT: 158 LBS | DIASTOLIC BLOOD PRESSURE: 91 MMHG | HEART RATE: 77 BPM

## 2020-05-26 DIAGNOSIS — Z82.49 FAMILY HISTORY OF ISCHEMIC HEART DISEASE AND OTHER DISEASES OF THE CIRCULATORY SYSTEM: ICD-10-CM

## 2020-05-26 DIAGNOSIS — Z83.3 FAMILY HISTORY OF DIABETES MELLITUS: ICD-10-CM

## 2020-05-26 DIAGNOSIS — Z80.9 FAMILY HISTORY OF MALIGNANT NEOPLASM, UNSPECIFIED: ICD-10-CM

## 2020-05-26 PROCEDURE — 31575 DIAGNOSTIC LARYNGOSCOPY: CPT

## 2020-05-26 PROCEDURE — 99204 OFFICE O/P NEW MOD 45 MIN: CPT | Mod: 25

## 2020-05-26 NOTE — PHYSICAL EXAM
[FreeTextEntry1] : voice not breathy, but there are vocal breaks [de-identified] : no palpable masses or lesions, neck soft [Midline] : trachea located in midline position [Laryngoscopy Performed] : laryngoscopy was performed, see procedure section for findings [Normal] : no rashes

## 2020-05-26 NOTE — DATA REVIEWED
[de-identified] : US Neck 5/19/2020:\par \par The right lobe of the thyroid gland is 4.3 x 1.2 x 1.8 cm and appears unremarkable.\par The left lobe of the thyroid gland is 5.6 x 2.0 x 2.6 cm. It contains an isoechoic solid nodule with relatively well-defined margins, measurements are 3.0 x 1.8 x 2.1 cm.\par The thyroid isthmus is 0.4 cm in AP diameter. \par No lymphadenopathy is seen in the neck.\par

## 2020-05-26 NOTE — PROCEDURE
[FreeTextEntry3] : Fiberoptic Laryngoscopy:\par upper airway widely patent\par TVF symmetric and mobile\par no masses or lesions

## 2020-05-26 NOTE — ASSESSMENT
[FreeTextEntry1] : 45F here for initial evaluation. Over the past 2-3 months, pt c/o difficulty swallowing solid foods. She feels as if there is something blocking the food from going down. Liquids are mostly unaffected. There is no regurgitation or hemoptysis. She feels her voice is weaker and she has vocal stops at times when she talks and her throat is severely dry. During this time, she reports a 20lb weight loss. Otherwise, there is no hoarseness, no difficulty breathing. There is significant anxiety and she is convinced 'something is wrong with [her] throat.'  Neck sonogram shows a 3.0x1.8x2.1cm isoechoic solid left sided thyroid nodule with relatively well-defined margins. On exam, though voice is strong, there are frequent vocal stops. Otherwise, the rest of the complete and comprehensive head and neck exam, including fiberoptic laryngoscopy, is otherwise unremarkable.\par She has dysphagia to solids with 20lb weight loss and dysphonia over the past 2-3 months of unclear etiology, though the history is certainly worrisome. For now, will send for CT neck and esophagram to r/o mass/lesions. Also will get TFTs and send for US-guided FNA of left thyroid nodule, though I doubt this nodule is causing her sx.\par All of this was discussed at length and all questions answered. RTO after tests are done.

## 2020-05-26 NOTE — CONSULT LETTER
[Dear  ___] : Dear ~STANLEY, [Consult Closing:] : Thank you very much for allowing me to participate in the care of this patient.  If you have any questions, please do not hesitate to contact me. [Courtesy Letter:] : I had the pleasure of seeing your patient, [unfilled], in my office today. [Sincerely,] : Sincerely, [FreeTextEntry3] : Armen Pineda MD\par Department of Otolaryngology - Head and Neck Surgery\par [facility]

## 2020-06-30 ENCOUNTER — APPOINTMENT (OUTPATIENT)
Dept: OTOLARYNGOLOGY | Facility: CLINIC | Age: 45
End: 2020-06-30
Payer: COMMERCIAL

## 2020-06-30 DIAGNOSIS — R13.10 DYSPHAGIA, UNSPECIFIED: ICD-10-CM

## 2020-06-30 PROCEDURE — 99214 OFFICE O/P EST MOD 30 MIN: CPT

## 2020-06-30 NOTE — CONSULT LETTER
[Dear  ___] : Dear ~STANLEY, [Courtesy Letter:] : I had the pleasure of seeing your patient, [unfilled], in my office today. [Consult Closing:] : Thank you very much for allowing me to participate in the care of this patient.  If you have any questions, please do not hesitate to contact me. [FreeTextEntry3] : Armen Pineda MD\par Department of Otolaryngology - Head and Neck Surgery\par [facility] [Sincerely,] : Sincerely,

## 2020-06-30 NOTE — HISTORY OF PRESENT ILLNESS
[de-identified] : 45F here in followup, to review testing results.\par \par Over the past 3 months, pt c/o difficulty swallowing solid foods. She feels as if there is something blocking the food from going down. Liquids are unaffected. There is no regurgitation or hemoptysis. She feels her voice is weaker and she has vocal stops at times when she talks. Her throat is severely dry. During this time, she reports a 20lb weight loss. Otherwise, there is no hoarseness, no difficulty breathing. There is significant anxiety and she is convinced 'something is wrong with [her] throat.'\par \par US Neck 5/19/20:\par 3.0x1.8x2.1cm isoechoic solid nodule with relatively well-defined margins\par \par FNA, left thyroid nodule 6/11/20:\par --bethesda III\par --thyroseq positive w intermediate-high (70-80%) probability for cancer, NRAS and EIF1AX mutation\par \par CT Neck 6/9/20 (I reviewed imaging):\par -There is a heterogeneously enhancing nodule measuring 1.9 cm x 2.0 cm involving the isthmus and left thyroid lobe.\par \par Esophagram 6/29/20:\par -Mild gastroesophageal reflux. \par \par ROS otherwise unremarkable.

## 2020-06-30 NOTE — DATA REVIEWED
[de-identified] : US Neck 5/19/2020:\par \par The right lobe of the thyroid gland is 4.3 x 1.2 x 1.8 cm and appears unremarkable.\par The left lobe of the thyroid gland is 5.6 x 2.0 x 2.6 cm. It contains an isoechoic solid nodule with relatively well-defined margins, measurements are 3.0 x 1.8 x 2.1 cm.\par The thyroid isthmus is 0.4 cm in AP diameter. \par No lymphadenopathy is seen in the neck.\par   [de-identified] : CT NEck 6/9/20:\par FINDINGS:  \par \par Evaluation of portions of the oral cavity, oropharynx and soft tissues at these levels, including the parotid glands, is limited secondary to streak artifact from dental amalgam. Allowing for this, no parotid gland mass is identified. The submandibular glands are unremarkable. There is a heterogeneously enhancing nodule measuring 1.9 cm x 2.0 cm involving the isthmus and left thyroid lobe.\par \par The aerodigestive tract is unremarkable. There is a punctate calcification within the right palatine tonsil, perhaps representing sequela of an old infectious/inflammatory process. There is no soft tissue mass within the neck. The prevertebral soft tissues and epiglottis are unremarkable. There is no jugular chain lymphadenopathy.\par \par The visualized lung apices are unremarkable. There is mild reversal of the normal cervical curvature. The paranasal sinuses and mastoid air cells are aerated.\par \par The orbital structures are unremarkable. The visualized brain parenchyma is unremarkable.\par \par IMPRESSION:  \par \par Heterogeneously enhancing nodule involving the isthmus and left thyroid lobe; otherwise, allowing for streak artifact from dental amalgam, essentially unremarkable CT scan of the neck.

## 2020-06-30 NOTE — PHYSICAL EXAM
[FreeTextEntry1] : voice not breathy, but there are vocal breaks [de-identified] : no palpable masses or lesions, neck soft [Midline] : trachea located in midline position [Laryngoscopy Performed] : laryngoscopy was performed, see procedure section for findings [Normal] : no rashes

## 2020-06-30 NOTE — ASSESSMENT
[FreeTextEntry1] : 45F here in followup. Over the past 3 months, pt c/o difficulty swallowing solid foods. She feels as if there is something blocking the food from going down. Liquids are mostly unaffected. There is no regurgitation or hemoptysis. She feels her voice is weaker and she has vocal stops at times when she talks and her throat is severely dry. During this time, she reports a 20lb weight loss. Otherwise, there is no hoarseness, no difficulty breathing. There is significant anxiety and she is convinced 'something is wrong with [her] throat.'  Neck sonogram shows a 3.0x1.8x2.1cm isoechoic solid left sided thyroid nodule with relatively well-defined margins. FNAis bethesda III and thyroseq is positive w an intermediate-high (70-80%) probability for cancer. Esophagram is unremarkable. On exam, though voice is strong, there are rare vocal stops. Otherwise, the rest of the complete and comprehensive head and neck exam is unremarkable. Laryngoscopy at initial visit is normal.\par She has dysphagia to solids with 20lb weight loss and dysphonia over the past 3 months of unclear etiology. Her esophagram is completely normal and CT neck only shows a large left thyroid nodule, which on molecular testing shows likely thyroid cancer. This was discussed at length with pt and all questions answered,. At this point, I recommend total thyroidectomy. This procedure was discussed at length, including risks and benefits, and all questions answered. Plan for OR in the next month.

## 2020-07-10 ENCOUNTER — NON-APPOINTMENT (OUTPATIENT)
Age: 45
End: 2020-07-10

## 2020-07-10 ENCOUNTER — APPOINTMENT (OUTPATIENT)
Dept: INTERNAL MEDICINE | Facility: CLINIC | Age: 45
End: 2020-07-10
Payer: COMMERCIAL

## 2020-07-10 ENCOUNTER — LABORATORY RESULT (OUTPATIENT)
Age: 45
End: 2020-07-10

## 2020-07-10 VITALS
TEMPERATURE: 98.8 F | WEIGHT: 158 LBS | HEART RATE: 72 BPM | DIASTOLIC BLOOD PRESSURE: 90 MMHG | SYSTOLIC BLOOD PRESSURE: 138 MMHG | HEIGHT: 61 IN | OXYGEN SATURATION: 98 % | BODY MASS INDEX: 29.83 KG/M2

## 2020-07-10 DIAGNOSIS — R73.03 PREDIABETES.: ICD-10-CM

## 2020-07-10 DIAGNOSIS — Z87.442 PERSONAL HISTORY OF URINARY CALCULI: ICD-10-CM

## 2020-07-10 DIAGNOSIS — Z87.59 PERSONAL HISTORY OF OTHER COMPLICATIONS OF PREGNANCY, CHILDBIRTH AND THE PUERPERIUM: ICD-10-CM

## 2020-07-10 DIAGNOSIS — Z01.818 ENCOUNTER FOR OTHER PREPROCEDURAL EXAMINATION: ICD-10-CM

## 2020-07-10 PROCEDURE — 93000 ELECTROCARDIOGRAM COMPLETE: CPT

## 2020-07-10 PROCEDURE — 99204 OFFICE O/P NEW MOD 45 MIN: CPT | Mod: 25

## 2020-07-10 PROCEDURE — 36415 COLL VENOUS BLD VENIPUNCTURE: CPT

## 2020-07-10 RX ORDER — METFORMIN HYDROCHLORIDE 500 MG/1
500 TABLET, COATED ORAL
Qty: 30 | Refills: 3 | Status: COMPLETED | COMMUNITY
Start: 2020-05-24 | End: 2020-07-10

## 2020-07-10 RX ORDER — METRONIDAZOLE 7.5 MG/G
0.75 GEL VAGINAL
Qty: 70 | Refills: 0 | Status: COMPLETED | COMMUNITY
Start: 2020-02-24 | End: 2020-07-10

## 2020-07-10 RX ORDER — BLOOD SUGAR DIAGNOSTIC
STRIP MISCELLANEOUS
Qty: 100 | Refills: 0 | Status: COMPLETED | COMMUNITY
Start: 2020-02-28 | End: 2020-07-10

## 2020-07-10 RX ORDER — BLOOD-GLUCOSE METER
KIT MISCELLANEOUS
Qty: 1 | Refills: 0 | Status: COMPLETED | COMMUNITY
Start: 2020-02-28 | End: 2020-07-10

## 2020-07-10 RX ORDER — FLUCONAZOLE 150 MG/1
150 TABLET ORAL
Qty: 1 | Refills: 0 | Status: COMPLETED | COMMUNITY
Start: 2020-02-24 | End: 2020-07-10

## 2020-07-15 ENCOUNTER — LABORATORY RESULT (OUTPATIENT)
Age: 45
End: 2020-07-15

## 2020-07-16 ENCOUNTER — TRANSCRIPTION ENCOUNTER (OUTPATIENT)
Age: 45
End: 2020-07-16

## 2020-07-16 VITALS
OXYGEN SATURATION: 99 % | HEART RATE: 80 BPM | SYSTOLIC BLOOD PRESSURE: 146 MMHG | WEIGHT: 158.51 LBS | TEMPERATURE: 98 F | DIASTOLIC BLOOD PRESSURE: 84 MMHG | HEIGHT: 61 IN | RESPIRATION RATE: 18 BRPM

## 2020-07-17 ENCOUNTER — OUTPATIENT (OUTPATIENT)
Dept: OUTPATIENT SERVICES | Facility: HOSPITAL | Age: 45
LOS: 1 days | Discharge: HOME | End: 2020-07-17
Payer: COMMERCIAL

## 2020-07-17 ENCOUNTER — APPOINTMENT (OUTPATIENT)
Dept: OTOLARYNGOLOGY | Facility: HOSPITAL | Age: 45
End: 2020-07-17

## 2020-07-17 ENCOUNTER — RESULT REVIEW (OUTPATIENT)
Age: 45
End: 2020-07-17

## 2020-07-17 DIAGNOSIS — Z87.442 PERSONAL HISTORY OF URINARY CALCULI: Chronic | ICD-10-CM

## 2020-07-17 DIAGNOSIS — Z98.891 HISTORY OF UTERINE SCAR FROM PREVIOUS SURGERY: Chronic | ICD-10-CM

## 2020-07-17 DIAGNOSIS — Z98.890 OTHER SPECIFIED POSTPROCEDURAL STATES: Chronic | ICD-10-CM

## 2020-07-17 LAB
CALCIUM SERPL-MCNC: 8.8 MG/DL — SIGNIFICANT CHANGE UP (ref 8.4–10.5)
GLUCOSE BLDC GLUCOMTR-MCNC: 175 MG/DL — HIGH (ref 70–99)
GLUCOSE BLDC GLUCOMTR-MCNC: 196 MG/DL — HIGH (ref 70–99)
GLUCOSE BLDC GLUCOMTR-MCNC: 236 MG/DL — HIGH (ref 70–99)
GLUCOSE BLDC GLUCOMTR-MCNC: 254 MG/DL — HIGH (ref 70–99)
PTH-INTACT IO % DIF SERPL: 10.5 PG/ML — SIGNIFICANT CHANGE UP (ref 8.5–72.5)

## 2020-07-17 PROCEDURE — 60240 REMOVAL OF THYROID: CPT

## 2020-07-17 RX ORDER — CALCIUM CARBONATE 500(1250)
2 TABLET ORAL
Refills: 0 | Status: DISCONTINUED | OUTPATIENT
Start: 2020-07-17 | End: 2020-07-18

## 2020-07-17 RX ORDER — DEXTROSE 50 % IN WATER 50 %
12.5 SYRINGE (ML) INTRAVENOUS ONCE
Refills: 0 | Status: DISCONTINUED | OUTPATIENT
Start: 2020-07-17 | End: 2020-07-18

## 2020-07-17 RX ORDER — CALCITRIOL 0.5 UG/1
0.5 CAPSULE ORAL DAILY
Refills: 0 | Status: DISCONTINUED | OUTPATIENT
Start: 2020-07-18 | End: 2020-07-18

## 2020-07-17 RX ORDER — CEFAZOLIN SODIUM 1 G
2000 VIAL (EA) INJECTION EVERY 8 HOURS
Refills: 0 | Status: DISCONTINUED | OUTPATIENT
Start: 2020-07-17 | End: 2020-07-18

## 2020-07-17 RX ORDER — DEXTROSE 50 % IN WATER 50 %
15 SYRINGE (ML) INTRAVENOUS ONCE
Refills: 0 | Status: DISCONTINUED | OUTPATIENT
Start: 2020-07-17 | End: 2020-07-18

## 2020-07-17 RX ORDER — CALCIUM CARBONATE 500(1250)
2 TABLET ORAL ONCE
Refills: 0 | Status: DISCONTINUED | OUTPATIENT
Start: 2020-07-17 | End: 2020-07-18

## 2020-07-17 RX ORDER — HEPARIN SODIUM 5000 [USP'U]/ML
5000 INJECTION INTRAVENOUS; SUBCUTANEOUS EVERY 8 HOURS
Refills: 0 | Status: DISCONTINUED | OUTPATIENT
Start: 2020-07-17 | End: 2020-07-18

## 2020-07-17 RX ORDER — DEXTROSE 50 % IN WATER 50 %
25 SYRINGE (ML) INTRAVENOUS ONCE
Refills: 0 | Status: DISCONTINUED | OUTPATIENT
Start: 2020-07-17 | End: 2020-07-18

## 2020-07-17 RX ORDER — SODIUM CHLORIDE 9 MG/ML
1000 INJECTION, SOLUTION INTRAVENOUS
Refills: 0 | Status: DISCONTINUED | OUTPATIENT
Start: 2020-07-17 | End: 2020-07-18

## 2020-07-17 RX ORDER — MORPHINE SULFATE 50 MG/1
2 CAPSULE, EXTENDED RELEASE ORAL
Refills: 0 | Status: DISCONTINUED | OUTPATIENT
Start: 2020-07-17 | End: 2020-07-18

## 2020-07-17 RX ORDER — GLUCAGON INJECTION, SOLUTION 0.5 MG/.1ML
1 INJECTION, SOLUTION SUBCUTANEOUS ONCE
Refills: 0 | Status: DISCONTINUED | OUTPATIENT
Start: 2020-07-17 | End: 2020-07-18

## 2020-07-17 RX ORDER — ACETAMINOPHEN 500 MG
650 TABLET ORAL EVERY 6 HOURS
Refills: 0 | Status: DISCONTINUED | OUTPATIENT
Start: 2020-07-17 | End: 2020-07-18

## 2020-07-17 RX ORDER — INSULIN LISPRO 100/ML
VIAL (ML) SUBCUTANEOUS
Refills: 0 | Status: DISCONTINUED | OUTPATIENT
Start: 2020-07-17 | End: 2020-07-18

## 2020-07-17 RX ORDER — IBUPROFEN 200 MG
400 TABLET ORAL EVERY 6 HOURS
Refills: 0 | Status: DISCONTINUED | OUTPATIENT
Start: 2020-07-17 | End: 2020-07-18

## 2020-07-17 RX ORDER — CALCITRIOL 0.5 UG/1
0.5 CAPSULE ORAL ONCE
Refills: 0 | Status: DISCONTINUED | OUTPATIENT
Start: 2020-07-17 | End: 2020-07-18

## 2020-07-17 RX ORDER — OXYCODONE AND ACETAMINOPHEN 5; 325 MG/1; MG/1
1 TABLET ORAL EVERY 6 HOURS
Refills: 0 | Status: DISCONTINUED | OUTPATIENT
Start: 2020-07-17 | End: 2020-07-18

## 2020-07-17 RX ADMIN — Medication 3: at 13:11

## 2020-07-17 RX ADMIN — Medication 1: at 22:31

## 2020-07-17 RX ADMIN — HEPARIN SODIUM 5000 UNIT(S): 5000 INJECTION INTRAVENOUS; SUBCUTANEOUS at 21:34

## 2020-07-17 RX ADMIN — HEPARIN SODIUM 5000 UNIT(S): 5000 INJECTION INTRAVENOUS; SUBCUTANEOUS at 14:21

## 2020-07-17 RX ADMIN — Medication 100 MILLIGRAM(S): at 21:34

## 2020-07-17 RX ADMIN — Medication 100 MILLIGRAM(S): at 14:21

## 2020-07-17 NOTE — H&P ADULT - NSHPPHYSICALEXAM_GEN_ALL_CORE
Gen: NAD, A+OX3  Face: NCAT   Nose: clear to anterior rhinoscopy  OC/OP: MMM, OP clear  Neck: midline anterior neck incision present, C/D/I with steri strips in place.  1 X ARGENIS in place.  Resp: breathing unlabored on RA

## 2020-07-17 NOTE — BRIEF OPERATIVE NOTE - OPERATION/FINDINGS
Left thyroid nodule,  all parathyroids identified and left in patient.  RLNs identified and stimulated at end of case.  1X ARGENIS placed.

## 2020-07-17 NOTE — BRIEF OPERATIVE NOTE - PRIMARY SURGEON
cc:

FÉLIX HART

****

 

DATE OF CONSULTATION

02/09/2017

 

REASON FOR CONSULTATION

Left shoulder and left-sided rib pain.

 

HISTORY

Mr. Cr is an 87-year-old male who apparently had a trip and fall accident

landing on his left side.  He was originally sent to the [facility] and was diagnosed with a pneumothorax as well as left-sided scapular

fracture and left sided rib fractures.  He is transferred to [facility].

 

Currently the patient has been admitted and was seen in his hospital room.  He

states that he has continued left-sided rib pain as well as left shoulder pain.

He is in a sling, states this helps.  He denies any numbness or tingling in the

left upper extremity.  He does note he had limited motion in his shoulder

previously.  He has no other major complaints.  He does deny shortness breath,

headaches, chest pain.  The patient did not lose consciousness during his

fall.

 

PAST MEDICAL HISTORY

1. Atrial fibrillation.

2. Hypothyroidism.

3. Hypertension.

 

PAST SURGICAL HISTORY

1. Cholecystectomy.

2. CABG.

3. Eye surgery.

 

ALLERGIES

CODEINE.

 

MEDICATIONS

1. Warfarin.

2. Lorazepam.

3. Levothyroxine.

4. Toprol.

5. Amiodarone.

6. Ramipril.

 

SOCIAL HISTORY

The patient use of tobacco or alcohol.

 

FAMILY HISTORY

Noncontributory.

 

REVIEW OF SYSTEMS

A 12-point review of systems was performed and was negative except as mentioned

in the HPI.

 

LABORATORY DATA

White blood cell count 11.1, hemoglobin 12.8, hematocrit 38.5, platelets 172.

 

Coagulation - 1.7.

 

PHYSICAL EXAMINATION

General:  The patient is lying in his hospital bed.  He has a sling on his left

upper extremity.  He appears in no acute distress.

Head, Ears, Nose And Throat: Pupils are receive equal, round, reactive to

light.

Neck:  Supple.  Trachea midline.  No JVD.

Lungs:  Clear to auscultation bilaterally.

Chest:  He does have tenderness on the left side as well as slight discomfort

with inhalation.  The chest again it is tender on the left side over his rib

cage.

Heart:  Regular rate and rhythm.  No murmurs, rubs or  gallops noted.

GI:  Soft, nontender, nondistended.

Neurologic:  Nonfocal.

Musculoskeletal:  No obvious deformities.  He does have function of bilateral

lower extremities, negative Homans.  Neurovascular and sensation intact

distally.  Capillary refill is present.

 

Regarding his upper extremities, he does have limited range of motion of his

right shoulder, otherwise neurovascular and sensation intact.  Left shoulder

tenderness over the scapular region.  Range of motion and strength testing not

performed due to fracture.  He is neurovascularly and sensation intact

distally.  He has 2+ radial pulse.  He is able to flex and extend all five

fingers.  He has appropriate  strength in bilateral upper extremities.

 

X-RAYS

X-rays of the ribs shows left-sided fractures of the eighth and ninth ribs with

no significant displacement.

 

CT scan of the left shoulder shows a minimally displaced scapular fracture as

well as high-riding humeral head consistent with chronic rotator cuff

deficiency.  The patient also had a chest x-ray with evidence of slight

pneumothorax which has since resolved on the most recent chest x-ray.

 

ASSESSMENT

An 87-year-old male status post fall sustaining left-sided scapular fracture

with minimal displacement as well as left-sided eighth and ninth rib fractures.

The patient is on Coumadin and did have a small pneumothorax which has since

resolved.

 

PLAN

Discussed diagnosis and treatment options with the patient at bedside as well

as his daughter.  I have recommend nonoperative management, continue with a

sling.  No significant weight to the left upper extremity.  The patient may

weight-bear as tolerated otherwise.  The patient has been instructed to follow

up with Dr. Félix Hart at the [facility] in

approximately 2 weeks from his discharge.  The patient and the patient's

daughter have asked appropriate questions which have all been answered.

 

The patient's course of action has been discussed with Dr. Hart.

 

He is orthopedically stable.

 

Thank you for this consultation.

 

 

Dictated by: David Carrasco PA-C

 

 

 

                              _________________________________

                              Félix Hart MD

 

 

 

JWM/SSB

D:  2/9/2017/9:05 AM

T:  2/9/2017/10:33 AM

Visit #:  [id number]

Job #:  [id number] Armen Pineda

## 2020-07-17 NOTE — H&P ADULT - ASSESSMENT
45F POD 0 s/p total thyroid for left thyroid nodule [place] III and thyroseq 80% of malignancy PTC.   - 2pm PTH and Calcium   - ISS   - Regional bed   - Pain control   - ADAT to regular diet   - ARGENIS drain care   - ancef for duration of ARGENIS drain.

## 2020-07-17 NOTE — H&P ADULT - HISTORY OF PRESENT ILLNESS
HPI: 45F w/ no elicited medical history although found to be DM due to high blood sugar intraop now s/p total thyroidectomy for left thyroid nodule Mason III but thyroseq moderate to high chance (80% malignancy for PTC.  PT tolerated case well and was transferred to PACU In stable condition with plans for PM PTH and calcium level and transfer to regional bed for overnight monitoring of ARGENIS and calcium.

## 2020-07-18 VITALS
SYSTOLIC BLOOD PRESSURE: 149 MMHG | OXYGEN SATURATION: 96 % | TEMPERATURE: 99 F | RESPIRATION RATE: 16 BRPM | HEART RATE: 95 BPM | DIASTOLIC BLOOD PRESSURE: 85 MMHG

## 2020-07-18 LAB
GLUCOSE BLDC GLUCOMTR-MCNC: 142 MG/DL — HIGH (ref 70–99)
PTH-INTACT IO % DIF SERPL: 17.8 PG/ML — SIGNIFICANT CHANGE UP (ref 8.5–72.5)

## 2020-07-18 RX ORDER — BENZOCAINE AND MENTHOL 5; 1 G/100ML; G/100ML
1 LIQUID ORAL DAILY
Refills: 0 | Status: DISCONTINUED | OUTPATIENT
Start: 2020-07-18 | End: 2020-07-18

## 2020-07-18 RX ORDER — LEVOTHYROXINE SODIUM 125 MCG
112 TABLET ORAL DAILY
Refills: 0 | Status: DISCONTINUED | OUTPATIENT
Start: 2020-07-18 | End: 2020-07-18

## 2020-07-18 RX ORDER — ACETAMINOPHEN WITH CODEINE 300MG-30MG
1 TABLET ORAL
Qty: 10 | Refills: 0
Start: 2020-07-18 | End: 2020-07-20

## 2020-07-18 RX ORDER — LEVOTHYROXINE SODIUM 125 MCG
1 TABLET ORAL
Qty: 60 | Refills: 2
Start: 2020-07-18 | End: 2021-01-13

## 2020-07-18 RX ADMIN — Medication 100 MILLIGRAM(S): at 05:02

## 2020-07-18 RX ADMIN — HEPARIN SODIUM 5000 UNIT(S): 5000 INJECTION INTRAVENOUS; SUBCUTANEOUS at 05:01

## 2020-07-18 RX ADMIN — Medication 2 TABLET(S): at 05:01

## 2020-07-18 RX ADMIN — BENZOCAINE AND MENTHOL 1 LOZENGE: 5; 1 LIQUID ORAL at 10:28

## 2020-07-20 LAB
ALBUMIN SERPL ELPH-MCNC: 4.6 G/DL
ALP BLD-CCNC: 66 U/L
ALT SERPL-CCNC: 21 U/L
ANION GAP SERPL CALC-SCNC: 13 MMOL/L
APPEARANCE: CLEAR
APTT BLD: 32 SEC
AST SERPL-CCNC: 12 U/L
BASOPHILS # BLD AUTO: 0.04 K/UL
BASOPHILS NFR BLD AUTO: 0.5 %
BILIRUB SERPL-MCNC: 0.4 MG/DL
BILIRUBIN URINE: NEGATIVE
BLOOD URINE: NEGATIVE
BUN SERPL-MCNC: 7 MG/DL
CALCIUM SERPL-MCNC: 9.4 MG/DL
CHLORIDE SERPL-SCNC: 102 MMOL/L
CO2 SERPL-SCNC: 23 MMOL/L
COLOR: COLORLESS
CREAT SERPL-MCNC: 0.45 MG/DL
EOSINOPHIL # BLD AUTO: 0.05 K/UL
EOSINOPHIL NFR BLD AUTO: 0.7 %
ESTIMATED AVERAGE GLUCOSE: 192 MG/DL
GLUCOSE QUALITATIVE U: NEGATIVE
GLUCOSE SERPL-MCNC: 167 MG/DL
HBA1C MFR BLD HPLC: 8.3 %
HCG UR QL: NEGATIVE
HCT VFR BLD CALC: 42.4 %
HGB BLD-MCNC: 13 G/DL
IMM GRANULOCYTES NFR BLD AUTO: 0.8 %
INR PPP: 0.96 RATIO
KETONES URINE: NEGATIVE
LEUKOCYTE ESTERASE URINE: ABNORMAL
LYMPHOCYTES # BLD AUTO: 2.22 K/UL
LYMPHOCYTES NFR BLD AUTO: 29.2 %
MAN DIFF?: NORMAL
MCHC RBC-ENTMCNC: 27.7 PG
MCHC RBC-ENTMCNC: 30.7 GM/DL
MCV RBC AUTO: 90.2 FL
MONOCYTES # BLD AUTO: 0.43 K/UL
MONOCYTES NFR BLD AUTO: 5.7 %
NEUTROPHILS # BLD AUTO: 4.79 K/UL
NEUTROPHILS NFR BLD AUTO: 63.1 %
NITRITE URINE: NEGATIVE
PH URINE: 7
PLATELET # BLD AUTO: 216 K/UL
POTASSIUM SERPL-SCNC: 4.1 MMOL/L
PROT SERPL-MCNC: 7.5 G/DL
PROTEIN URINE: NEGATIVE
PT BLD: 11.3 SEC
RBC # BLD: 4.7 M/UL
RBC # FLD: 14.6 %
SODIUM SERPL-SCNC: 138 MMOL/L
SPECIFIC GRAVITY URINE: 1.01
UROBILINOGEN URINE: NORMAL
WBC # FLD AUTO: 7.59 K/UL

## 2020-07-20 NOTE — HISTORY OF PRESENT ILLNESS
[No Pertinent Pulmonary History] : no history of asthma, COPD, sleep apnea, or smoking [No Pertinent Cardiac History] : no history of aortic stenosis, atrial fibrillation, coronary artery disease, recent myocardial infarction, or implantable device/pacemaker [No Adverse Anesthesia Reaction] : no adverse anesthesia reaction in self or family member [Excellent (>10 METs)] : Excellent (>10 METs) [Chronic Anticoagulation] : no chronic anticoagulation [Diabetes] : no diabetes [FreeTextEntry1] : Thyroidectomy w/ central neck dissection [Chronic Kidney Disease] : no chronic kidney disease [FreeTextEntry2] : 7/17/2020 [FreeTextEntry4] : Left thyroid cancerous nodule, dx during w/u for globus sensation.  Scheduled for complete thyroidectomy.  [FreeTextEntry3] : Dr. Pineda

## 2020-07-23 PROCEDURE — 88307 TISSUE EXAM BY PATHOLOGIST: CPT | Mod: 26

## 2020-07-23 PROCEDURE — 88305 TISSUE EXAM BY PATHOLOGIST: CPT | Mod: 26

## 2020-07-24 ENCOUNTER — APPOINTMENT (OUTPATIENT)
Dept: OTOLARYNGOLOGY | Facility: CLINIC | Age: 45
End: 2020-07-24
Payer: COMMERCIAL

## 2020-07-24 VITALS
TEMPERATURE: 98.6 F | BODY MASS INDEX: 29.42 KG/M2 | SYSTOLIC BLOOD PRESSURE: 158 MMHG | HEART RATE: 79 BPM | HEIGHT: 61 IN | WEIGHT: 155.8 LBS | DIASTOLIC BLOOD PRESSURE: 98 MMHG

## 2020-07-24 DIAGNOSIS — E04.1 NONTOXIC SINGLE THYROID NODULE: ICD-10-CM

## 2020-07-24 LAB — SURGICAL PATHOLOGY STUDY: SIGNIFICANT CHANGE UP

## 2020-07-24 PROCEDURE — 99024 POSTOP FOLLOW-UP VISIT: CPT

## 2020-07-24 NOTE — CONSULT LETTER
[Dear  ___] : Dear ~STANLEY, [Courtesy Letter:] : I had the pleasure of seeing your patient, [unfilled], in my office today. [Consult Closing:] : Thank you very much for allowing me to participate in the care of this patient.  If you have any questions, please do not hesitate to contact me. [DrMichelle  ___] : Dr. BARKER [FreeTextEntry3] : Armen Pineda MD\par Department of Otolaryngology - Head and Neck Surgery\par [facility] [Sincerely,] : Sincerely,

## 2020-07-24 NOTE — HISTORY OF PRESENT ILLNESS
[de-identified] : 45F here in first postoperative visit s/p total thyroidectomy 7/17/20 for thyroseq positive left thyroid nodule \par (intermediate-high [70-80%] probability for cancer, NRAS and EIF1AX mutation).\par \par She is doing well since surgery. Pain is controlled. There is no difficulty eating, breathing, swallowing, talking. No hypocalcemic sx. She is taking weight based levothyroxine.\par \par Pathology: pending\par \par ROS otherwise unremarkable.

## 2020-07-24 NOTE — ASSESSMENT
[FreeTextEntry1] : 45F here in first postoperative visit s/p total thyroidectomy 7/17/20 for thyroseq positive left thyroid nodule \par (intermediate-high [70-80%] probability for cancer, NRAS and EIF1AX mutation). Pathology is pending, but preliminary result is NIFTP. She is doing well since surgery. Pain is controlled. There is no difficulty eating, breathing, swallowing, talking. There are no hypocalcemic sx and she is taking weight-based levothyroxine. On exam, there are well healing, expected postoperative changes.\par She is doing well. To see endocrinologist in the next 2 weeks or so. RTO 4 weeks for wound check. I will f/u with pt in the interim with final pathology.

## 2020-07-24 NOTE — PHYSICAL EXAM
[FreeTextEntry1] : voice strong [de-identified] : steris and stitch removed; neck soft, nontender w appropriate postsurgical fullness [Midline] : trachea located in midline position [Normal] : no rashes

## 2020-07-24 NOTE — DATA REVIEWED
[de-identified] : CT NEck 6/9/20:\par FINDINGS:  \par \par Evaluation of portions of the oral cavity, oropharynx and soft tissues at these levels, including the parotid glands, is limited secondary to streak artifact from dental amalgam. Allowing for this, no parotid gland mass is identified. The submandibular glands are unremarkable. There is a heterogeneously enhancing nodule measuring 1.9 cm x 2.0 cm involving the isthmus and left thyroid lobe.\par \par The aerodigestive tract is unremarkable. There is a punctate calcification within the right palatine tonsil, perhaps representing sequela of an old infectious/inflammatory process. There is no soft tissue mass within the neck. The prevertebral soft tissues and epiglottis are unremarkable. There is no jugular chain lymphadenopathy.\par \par The visualized lung apices are unremarkable. There is mild reversal of the normal cervical curvature. The paranasal sinuses and mastoid air cells are aerated.\par \par The orbital structures are unremarkable. The visualized brain parenchyma is unremarkable.\par \par IMPRESSION:  \par \par Heterogeneously enhancing nodule involving the isthmus and left thyroid lobe; otherwise, allowing for streak artifact from dental amalgam, essentially unremarkable CT scan of the neck. [de-identified] : US Neck 5/19/2020:\par \par The right lobe of the thyroid gland is 4.3 x 1.2 x 1.8 cm and appears unremarkable.\par The left lobe of the thyroid gland is 5.6 x 2.0 x 2.6 cm. It contains an isoechoic solid nodule with relatively well-defined margins, measurements are 3.0 x 1.8 x 2.1 cm.\par The thyroid isthmus is 0.4 cm in AP diameter. \par No lymphadenopathy is seen in the neck.\par

## 2020-08-11 ENCOUNTER — APPOINTMENT (OUTPATIENT)
Dept: ENDOCRINOLOGY | Facility: CLINIC | Age: 45
End: 2020-08-11
Payer: COMMERCIAL

## 2020-08-11 VITALS
BODY MASS INDEX: 29.64 KG/M2 | HEIGHT: 61 IN | HEART RATE: 73 BPM | DIASTOLIC BLOOD PRESSURE: 87 MMHG | SYSTOLIC BLOOD PRESSURE: 152 MMHG | WEIGHT: 157 LBS

## 2020-08-11 LAB — GLUCOSE BLDC GLUCOMTR-MCNC: 121

## 2020-08-11 PROCEDURE — 99205 OFFICE O/P NEW HI 60 MIN: CPT | Mod: 25

## 2020-08-11 PROCEDURE — 82962 GLUCOSE BLOOD TEST: CPT | Mod: NC

## 2020-08-11 PROCEDURE — 36415 COLL VENOUS BLD VENIPUNCTURE: CPT

## 2020-08-12 LAB
25(OH)D3 SERPL-MCNC: 17.2 NG/ML
ALBUMIN SERPL ELPH-MCNC: 4.5 G/DL
ALP BLD-CCNC: 63 U/L
ALT SERPL-CCNC: 17 U/L
ANION GAP SERPL CALC-SCNC: 13 MMOL/L
AST SERPL-CCNC: 14 U/L
BILIRUB SERPL-MCNC: 0.3 MG/DL
BUN SERPL-MCNC: 5 MG/DL
CALCIUM SERPL-MCNC: 9.1 MG/DL
CHLORIDE SERPL-SCNC: 101 MMOL/L
CHOLEST SERPL-MCNC: 276 MG/DL
CHOLEST/HDLC SERPL: 4.6 RATIO
CO2 SERPL-SCNC: 26 MMOL/L
CREAT SERPL-MCNC: 0.47 MG/DL
CREAT SPEC-SCNC: 61 MG/DL
GLUCOSE SERPL-MCNC: 107 MG/DL
HDLC SERPL-MCNC: 61 MG/DL
LDLC SERPL CALC-MCNC: 201 MG/DL
MICROALBUMIN 24H UR DL<=1MG/L-MCNC: <1.2 MG/DL
MICROALBUMIN/CREAT 24H UR-RTO: NORMAL MG/G
POTASSIUM SERPL-SCNC: 4.2 MMOL/L
PROT SERPL-MCNC: 7.3 G/DL
SODIUM SERPL-SCNC: 140 MMOL/L
TRIGL SERPL-MCNC: 70 MG/DL
VIT B12 SERPL-MCNC: 400 PG/ML

## 2020-08-12 NOTE — ASSESSMENT
[FreeTextEntry1] : Type 2 diabetes mellitus/elevated body mass index. HbA1c 8.3% in July 2020 and blood glucose 121 mg/dL today. No known history of micro- or macrovascular complications. We discussed the cardiovascular and microvascular complications of uncontrolled diabetes. We discussed the importance of diet and exercise and lifestyle modification for glycemic control and weight loss. We discussed referral to nutrition. We discussed pharmacologic options for glycemic control and weight loss. She is amenable to starting metformin as below. We discussed the risks and benefits of metformin, including but not limited to nausea, diarrhea, lactic acidosis. We can consider starting a GLP-1 receptor agonist next visit as needed. We discussed the risks and benefits of the GLP-1 receptor agonist class, including but not limited to nausea, pancreatitis, medullary thyroid cancer. 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 She is status post total thyroidectomy in July 2020 for a Thyroseq positive left thyroid nodule. 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 No further evaluation is needed since her diagnosis is not thyroid cancer, however, we can consider monitoring as needed. We reviewed proper use and compliance with levothyroxine. \par Continue levothyroxine 112 mcg daily pending TSH next visit \par Check serum calcium \par \par Hair loss. We can readdress next visit.\par \par Return to see diabetes educator and nutrition as soon as possible. Return to see me in 2 months. Patient advised to call earlier with significant hypo- or hyperglycemia.

## 2020-08-12 NOTE — ADDENDUM
[FreeTextEntry1] : Recent laboratory results as below; discussed with Ms. Pablo. Lipid panel not at goal and recommend initiation of statin therapy. We discussed the risks and benefits of statin therapy, including but not limited to arthralgias and myalgias. We will start rosuvastatin 40 mg daily. Serum calcium within range. Vitamin D is low and recommend ergocalciferol 50,000 intl units every two weeks. Urine microalbumin within range. Other test results within range. 8/12/20

## 2020-08-12 NOTE — PHYSICAL EXAM
[Healthy Appearance] : healthy appearance [Alert] : alert [No Neck Mass] : no neck mass was observed [Normal Sclera/Conjunctiva] : normal sclera/conjunctiva [No Acute Distress] : no acute distress [Supple] : the neck was supple [No LAD] : no lymphadenopathy [No Respiratory Distress] : no respiratory distress [Well Healed Scar] : well healed scar [Clear to Auscultation] : lungs were clear to auscultation bilaterally [Normal S1, S2] : normal S1 and S2 [Normal Rate] : heart rate was normal [Regular Rhythm] : with a regular rhythm [Normal Gait] : normal gait [No Stigmata of Cushings Syndrome] : no stigmata of Cushings Syndrome [Left Foot Was Examined] : left foot ~C was examined [Right Foot Was Examined] : right foot ~C was examined [Acanthosis Nigricans] : acanthosis nigricans present [Normal] : normal [Normal Insight/Judgement] : insight and judgment were intact [2+] : 2+ in the dorsalis pedis [Kyphosis] : no kyphosis present [de-identified] : no palpable thyroid tissue [Diminished Throughout Both Feet] : normal tactile sensation with monofilament testing throughout both feet [de-identified] : no moon facies, no supraclavicular fat pads

## 2020-08-12 NOTE — HISTORY OF PRESENT ILLNESS
[FreeTextEntry1] : Ms. 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 HbA1c 8.3% in July 2020 and blood glucose 121 mg/dL today. No known history of micro- or macrovascular complications. Her mother and father have a history of diabetes.\par She was diagnosed with diabetes in July 2020 by hemoglobin A1c. She had a history of gestational diabetes with her three pregnancies. No hospitalizations for hypo- or hyperglycemia.\par She was prescribed metformin 500 mg twice daily but has not yet started\par She has been checking blood sugars a few times a week. 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 Postoperative hypothyroidism.\par She is status post total thyroidectomy in July 2020 for a Thyroseq positive left thyroid nodule. 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 \par She is taking levothyroxine in the morning, on an empty stomach, with plain water, and waiting at least 30 minutes before eating. She is not taking calcium/iron/multivitamin. \par No history of radiation exposure.\par No known family history of thyroid cancer.\par \par She has a history of fatigue, headaches, dizziness, blurry vision, easy bruising, hair loss. She has started having less sweets since her diagnosis of diabetes. No chest pain, shortness of breath, polyuria/polydipsia, lower extremity numbness/tingling. She has a Mirena intrauterine device in place; she may remove for tubal ligation instead.

## 2020-08-18 ENCOUNTER — APPOINTMENT (OUTPATIENT)
Dept: OTOLARYNGOLOGY | Facility: CLINIC | Age: 45
End: 2020-08-18
Payer: COMMERCIAL

## 2020-08-18 PROCEDURE — 99024 POSTOP FOLLOW-UP VISIT: CPT

## 2020-08-18 NOTE — PHYSICAL EXAM
[FreeTextEntry1] : voice strong [de-identified] : neck flat, incision clean and dry, minimal redness, no pain. [Midline] : trachea located in midline position [Normal] : no rashes

## 2020-08-18 NOTE — HISTORY OF PRESENT ILLNESS
[de-identified] : 45F here in first postoperative visit s/p total thyroidectomy 7/17/20 for thyroseq positive left thyroid nodule \par (intermediate-high [70-80%] probability for cancer, NRAS and EIF1AX mutation). Fial pathology is negative for malignancy, as below-\par \par Final pathology:\par 1. Thyroid; total thyroidectomy:\par - Noninvasive follicular thyroid neoplasm with papillary-like nuclear features (NIFTP), 2.4 cm in greatest dimension,\par left-sided.\par - Adenomatoid nodules, left-sided.\par 2. Lymph node, level VI; dissection:\par - One lymph node negative for metastatic carcinoma (0/1).\par \par She is doing well since last seen. Pain is controlled. There is no difficulty eating, breathing, swallowing, talking. No hypocalcemic sx. She is taking weight based levothyroxine and vitamin D as per the endocrinologist.\par \par ROS otherwise unremarkable.

## 2020-08-18 NOTE — CONSULT LETTER
[Dear  ___] : Dear ~STANLEY, [Courtesy Letter:] : I had the pleasure of seeing your patient, [unfilled], in my office today. [Consult Closing:] : Thank you very much for allowing me to participate in the care of this patient.  If you have any questions, please do not hesitate to contact me. [Sincerely,] : Sincerely, [FreeTextEntry3] : Armen Pineda MD\par Department of Otolaryngology - Head and Neck Surgery\par [facility] [DrMichelle  ___] : Dr. BARKER

## 2020-08-18 NOTE — DATA REVIEWED
[de-identified] : US Neck 5/19/2020:\par \par The right lobe of the thyroid gland is 4.3 x 1.2 x 1.8 cm and appears unremarkable.\par The left lobe of the thyroid gland is 5.6 x 2.0 x 2.6 cm. It contains an isoechoic solid nodule with relatively well-defined margins, measurements are 3.0 x 1.8 x 2.1 cm.\par The thyroid isthmus is 0.4 cm in AP diameter. \par No lymphadenopathy is seen in the neck.\par   [de-identified] : CT NEck 6/9/20:\par FINDINGS:  \par \par Evaluation of portions of the oral cavity, oropharynx and soft tissues at these levels, including the parotid glands, is limited secondary to streak artifact from dental amalgam. Allowing for this, no parotid gland mass is identified. The submandibular glands are unremarkable. There is a heterogeneously enhancing nodule measuring 1.9 cm x 2.0 cm involving the isthmus and left thyroid lobe.\par \par The aerodigestive tract is unremarkable. There is a punctate calcification within the right palatine tonsil, perhaps representing sequela of an old infectious/inflammatory process. There is no soft tissue mass within the neck. The prevertebral soft tissues and epiglottis are unremarkable. There is no jugular chain lymphadenopathy.\par \par The visualized lung apices are unremarkable. There is mild reversal of the normal cervical curvature. The paranasal sinuses and mastoid air cells are aerated.\par \par The orbital structures are unremarkable. The visualized brain parenchyma is unremarkable.\par \par IMPRESSION:  \par \par Heterogeneously enhancing nodule involving the isthmus and left thyroid lobe; otherwise, allowing for streak artifact from dental amalgam, essentially unremarkable CT scan of the neck.

## 2020-08-18 NOTE — ASSESSMENT
[FreeTextEntry1] : 45F here in second routine postoperative visit s/p total thyroidectomy 7/17/20 for thyroseq positive left thyroid nodule (intermediate-high [70-80%] probability for cancer, NRAS and EIF1AX mutation). Pathology is benign and c/w NIFTP. She is doing very well since last seen. Pain is controlled. There is no difficulty eating, breathing, swallowing, talking. No hypocalcemic sx. She is taking weight based levothyroxine and vitamin D as per the endocrinologist. On exam, there are well healing, expected postoperative changes.\par She is doing very well. Continue with endocrine recs. RTO 3 months for wound check.

## 2020-08-20 ENCOUNTER — TRANSCRIPTION ENCOUNTER (OUTPATIENT)
Age: 45
End: 2020-08-20

## 2020-08-25 ENCOUNTER — APPOINTMENT (OUTPATIENT)
Dept: ENDOCRINOLOGY | Facility: CLINIC | Age: 45
End: 2020-08-25

## 2020-09-09 ENCOUNTER — APPOINTMENT (OUTPATIENT)
Dept: ENDOCRINOLOGY | Facility: CLINIC | Age: 45
End: 2020-09-09
Payer: COMMERCIAL

## 2020-09-09 ENCOUNTER — RESULT CHARGE (OUTPATIENT)
Age: 45
End: 2020-09-09

## 2020-09-09 VITALS
SYSTOLIC BLOOD PRESSURE: 140 MMHG | DIASTOLIC BLOOD PRESSURE: 92 MMHG | HEART RATE: 73 BPM | WEIGHT: 154 LBS | BODY MASS INDEX: 29.1 KG/M2

## 2020-09-09 LAB — GLUCOSE BLDC GLUCOMTR-MCNC: 163

## 2020-09-09 PROCEDURE — 97802 MEDICAL NUTRITION INDIV IN: CPT

## 2020-09-09 PROCEDURE — 99211 OFF/OP EST MAY X REQ PHY/QHP: CPT | Mod: 25

## 2020-09-09 PROCEDURE — 82962 GLUCOSE BLOOD TEST: CPT | Mod: NC

## 2020-10-08 ENCOUNTER — APPOINTMENT (OUTPATIENT)
Dept: INTERNAL MEDICINE | Facility: CLINIC | Age: 45
End: 2020-10-08
Payer: COMMERCIAL

## 2020-10-08 VITALS
WEIGHT: 155 LBS | HEIGHT: 61 IN | TEMPERATURE: 97.8 F | HEART RATE: 98 BPM | OXYGEN SATURATION: 98 % | DIASTOLIC BLOOD PRESSURE: 80 MMHG | BODY MASS INDEX: 29.27 KG/M2 | SYSTOLIC BLOOD PRESSURE: 112 MMHG

## 2020-10-08 DIAGNOSIS — M79.644 PAIN IN RIGHT FINGER(S): ICD-10-CM

## 2020-10-08 DIAGNOSIS — Z23 ENCOUNTER FOR IMMUNIZATION: ICD-10-CM

## 2020-10-08 PROCEDURE — G0008: CPT

## 2020-10-08 PROCEDURE — 99213 OFFICE O/P EST LOW 20 MIN: CPT | Mod: 25

## 2020-10-08 PROCEDURE — 90686 IIV4 VACC NO PRSV 0.5 ML IM: CPT

## 2020-10-08 NOTE — HISTORY OF PRESENT ILLNESS
[FreeTextEntry8] : right index finger pain\par - mechanical fall down stairs 4/2020 believes dislocated finger at the time\par - did not go to ED\par - 6 weeks ago twisted finger again but did not go to ED\par - now c/o pain/ weakness with grasp\par \par Left hand \par - numbness and tingling/finger stiffness.

## 2020-10-08 NOTE — PHYSICAL EXAM
[No Respiratory Distress] : no respiratory distress  [Normal] : affect was normal and insight and judgment were intact [de-identified] : tenderness base of right index finger

## 2020-10-09 ENCOUNTER — TRANSCRIPTION ENCOUNTER (OUTPATIENT)
Age: 45
End: 2020-10-09

## 2020-10-12 ENCOUNTER — APPOINTMENT (OUTPATIENT)
Dept: ENDOCRINOLOGY | Facility: CLINIC | Age: 45
End: 2020-10-12
Payer: COMMERCIAL

## 2020-10-12 VITALS
BODY MASS INDEX: 29.29 KG/M2 | HEART RATE: 73 BPM | SYSTOLIC BLOOD PRESSURE: 152 MMHG | DIASTOLIC BLOOD PRESSURE: 92 MMHG | WEIGHT: 155 LBS

## 2020-10-12 LAB
ALBUMIN SERPL ELPH-MCNC: 4.7 G/DL
ALP BLD-CCNC: 72 U/L
ALT SERPL-CCNC: 16 U/L
ANION GAP SERPL CALC-SCNC: 14 MMOL/L
AST SERPL-CCNC: 11 U/L
BILIRUB SERPL-MCNC: 0.4 MG/DL
BUN SERPL-MCNC: 8 MG/DL
CALCIUM SERPL-MCNC: 9.3 MG/DL
CALCIUM SERPL-MCNC: 9.3 MG/DL
CHLORIDE SERPL-SCNC: 100 MMOL/L
CO2 SERPL-SCNC: 24 MMOL/L
CREAT SERPL-MCNC: 0.45 MG/DL
FERRITIN SERPL-MCNC: 65 NG/ML
GLUCOSE BLDC GLUCOMTR-MCNC: 173
GLUCOSE SERPL-MCNC: 157 MG/DL
HBA1C MFR BLD HPLC: 7.3
IRON SATN MFR SERPL: 37 %
IRON SERPL-MCNC: 117 UG/DL
MAGNESIUM SERPL-MCNC: 2.2 MG/DL
PARATHYROID HORMONE INTACT: 29 PG/ML
PHOSPHATE SERPL-MCNC: 3.5 MG/DL
POTASSIUM SERPL-SCNC: 4.2 MMOL/L
PROT SERPL-MCNC: 7.7 G/DL
SODIUM SERPL-SCNC: 138 MMOL/L
TIBC SERPL-MCNC: 318 UG/DL
TSH SERPL-ACNC: 0.06 UIU/ML
UIBC SERPL-MCNC: 201 UG/DL

## 2020-10-12 PROCEDURE — 99214 OFFICE O/P EST MOD 30 MIN: CPT | Mod: 25

## 2020-10-12 PROCEDURE — 82962 GLUCOSE BLOOD TEST: CPT | Mod: NC

## 2020-10-12 PROCEDURE — 83036 HEMOGLOBIN GLYCOSYLATED A1C: CPT | Mod: QW

## 2020-10-12 NOTE — PHYSICAL EXAM
[Alert] : alert [Healthy Appearance] : healthy appearance [No Acute Distress] : no acute distress [Normal Sclera/Conjunctiva] : normal sclera/conjunctiva [No Neck Mass] : no neck mass was observed [No LAD] : no lymphadenopathy [Supple] : the neck was supple [Well Healed Scar] : well healed scar [No Respiratory Distress] : no respiratory distress [Clear to Auscultation] : lungs were clear to auscultation bilaterally [Normal S1, S2] : normal S1 and S2 [Normal Rate] : heart rate was normal [Regular Rhythm] : with a regular rhythm [No Stigmata of Cushings Syndrome] : no stigmata of Cushings Syndrome [Normal Gait] : normal gait [Acanthosis Nigricans] : acanthosis nigricans present [Right Foot Was Examined] : right foot ~C was examined [Left Foot Was Examined] : left foot ~C was examined [Normal] : normal [2+] : 2+ in the dorsalis pedis [Normal Insight/Judgement] : insight and judgment were intact [Kyphosis] : no kyphosis present [Diminished Throughout Both Feet] : normal tactile sensation with monofilament testing throughout both feet [de-identified] : no palpable thyroid tissue [de-identified] : no moon facies, no supraclavicular fat pads

## 2020-10-12 NOTE — ADDENDUM
[FreeTextEntry1] : Recent test results as below; discussed with Ms. Pablo. TSH 0.06 uIU/mL and recommended adjustment in levothyroxine from 112 to 100 mcg daily; prescription sent to pharmacy. We can repeat TSH next visit. Electrolytes and iron studies within range. Other test results within range. 10/12/20 - - -

## 2020-10-12 NOTE — ASSESSMENT
[FreeTextEntry1] : Type 2 diabetes mellitus/elevated body mass index. Point-of-care HbA1c 7.3% and blood glucose 173 mg/dL today; HbA1c 8.3% in July 2020. No known history of micro- or macrovascular complications. I congratulated her on her improving glycemic control. We discussed the cardiovascular and microvascular complications of uncontrolled diabetes. We discussed the importance of diet and exercise and lifestyle modification for glycemic control and weight loss. We discussed follow-up with nutrition. We discussed pharmacologic options for glycemic control and weight loss. She declines switching from immediate to extended release metformin today but we can consider next visit as needed.\par Continue metformin 500 mg twice daily \par Check blood sugars daily, fasting or postprandial\par She is not on a blood pressure regimen; blood pressure elevated today and will monitor\par She is not on a statin for cholesterol; we again discussed the risks and benefits of statin therapy, including but not limited to arthralgias and myalgias. We will start rosuvastatin 40 mg daily and she will call immediately if any worsening of symptoms\par Nephrology screening: Due\par Last ophthalmology appointment: July 2019\par Last dental appointment: May 2020\par She is up-to-date with influenza vaccine\par \par Non-invasive follicular thyroid 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 Postoperative hypothyroidism. She is status post total thyroidectomy in July 2020 for a Thyroseq positive left thyroid nodule. 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 No further evaluation is needed since her diagnosis is not thyroid cancer, however, we can consider monitoring as needed. We reviewed proper use and compliance with levothyroxine. We discussed that her symptoms are unlikely due to levothyroxine; we can consider a trial of brand-name levothyroxine as needed.\par Continue levothyroxine 112 mcg daily pending TSH level\par \par Vitamin D deficiency. I advised she start ergocalciferol 50,000 intl units every two weeks.\par \par Hair loss. We will perform laboratory evaluation as below. We can readdress next visit.\par \par Stressors. I referred her to Jill Lilly/social work.\par \par Return to see me in 4-6 weeks. Patient advised to call earlier with significant hypo- or hyperglycemia.

## 2020-10-12 NOTE — HISTORY OF PRESENT ILLNESS
[FreeTextEntry1] : Ms. 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 I saw her for an initial visit in August 2020.\par \par Type 2 diabetes mellitus. Point-of-care HbA1c 7.3% and blood glucose 173 mg/dL today; HbA1c 8.3% in July 2020. No known history of micro- or macrovascular complications. Her mother and father have a history of diabetes.\par She was diagnosed with diabetes in July 2020 by hemoglobin A1c. She had a history of gestational diabetes with her three pregnancies. No hospitalizations for hypo- or hyperglycemia.\par She was prescribed metformin 500 mg twice daily; she started therapy about two weeks before this appointment. \par She has been checking blood sugars a few times a week. Fasting blood sugars 120-160s mg/dL.\par She is not on a blood pressure regimen\par She is not on a statin for cholesterol\par Nephrology screening: Urine microalbumin within range in August 2020\par Last ophthalmology appointment: July 2019\par Last dental appointment: May 2020\par \par Non-invasive follicular thyroid 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 Postoperative hypothyroidism.\par She is status post total thyroidectomy in July 2020 for a Thyroseq positive left thyroid nodule. 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 \par She is taking levothyroxine in the morning, on an empty stomach, with plain water, and waiting at least 30 minutes before eating. She is not taking calcium/iron/multivitamin. \par No history of radiation exposure.\par No known family history of thyroid cancer.\par \par Interim History \par Laboratory results from last visit as below. Lipid panel not at goal and recommended initiation of statin therapy; she has not yet started. Serum calcium within range. Vitamin D low and recommended ergocalciferol 50,000 intl units every two weeks; she has not yet started. Urine microalbumin within range. Other test results within range. \par She has had significant bilateral hand cramping and pain up her arms that she attributes to levothyroxine. She saw Dr. Worley and was referred to orthopedics.\par She has seen Gisele Coto RN/diabetes educator and Ruchi Leonard RD/nutrition. \par She has made extensive lifestyle changes since her diagnosis of diabetes. She started metformin only two weeks ago and is taking 500 mg twice daily. She has had some loose stools with therapy.\par Her son was diagnosed with diabetes and she had to pay $3000 for his insulin until his insurance started. 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 \par She has a history of fatigue, headaches, dizziness, blurry vision, easy bruising, hair loss. No chest pain, shortness of breath, polyuria/polydipsia, lower extremity numbness/tingling. She has a Mirena intrauterine device in place; she may remove for tubal ligation instead.\par Medical and surgical history, medications, allergies, social and family history reviewed and updated as needed.

## 2020-10-27 ENCOUNTER — APPOINTMENT (OUTPATIENT)
Dept: ORTHOPEDIC SURGERY | Facility: CLINIC | Age: 45
End: 2020-10-27
Payer: COMMERCIAL

## 2020-10-27 VITALS — WEIGHT: 155 LBS | RESPIRATION RATE: 16 BRPM | HEIGHT: 61 IN | BODY MASS INDEX: 29.27 KG/M2

## 2020-10-27 DIAGNOSIS — R22.31 LOCALIZED SWELLING, MASS AND LUMP, RIGHT UPPER LIMB: ICD-10-CM

## 2020-10-27 PROCEDURE — 99203 OFFICE O/P NEW LOW 30 MIN: CPT

## 2020-10-27 PROCEDURE — 76882 US LMTD JT/FCL EVL NVASC XTR: CPT | Mod: RT

## 2020-10-27 PROCEDURE — 99072 ADDL SUPL MATRL&STAF TM PHE: CPT

## 2020-10-27 PROCEDURE — 73140 X-RAY EXAM OF FINGER(S): CPT | Mod: F6

## 2020-11-03 ENCOUNTER — APPOINTMENT (OUTPATIENT)
Dept: OTOLARYNGOLOGY | Facility: CLINIC | Age: 45
End: 2020-11-03
Payer: COMMERCIAL

## 2020-11-03 VITALS
TEMPERATURE: 97.6 F | WEIGHT: 156 LBS | SYSTOLIC BLOOD PRESSURE: 160 MMHG | BODY MASS INDEX: 29.45 KG/M2 | DIASTOLIC BLOOD PRESSURE: 89 MMHG | RESPIRATION RATE: 18 BRPM | HEIGHT: 61 IN | HEART RATE: 71 BPM

## 2020-11-03 PROCEDURE — 99213 OFFICE O/P EST LOW 20 MIN: CPT

## 2020-11-03 PROCEDURE — 99072 ADDL SUPL MATRL&STAF TM PHE: CPT

## 2020-11-03 NOTE — ASSESSMENT
[FreeTextEntry1] : 45F here in routine postoperative visit s/p total thyroidectomy 7/17/20 for thyroseq positive left thyroid nodule (intermediate-high [70-80%] probability for cancer, NRAS and EIF1AX mutation). Pathology is benign and c/w NIFTP. She is doing very well since last seen. There is some neck tightness, but improving and she feels well. There is no difficulty eating, breathing, swallowing, talking. She is taking weight based levothyroxine and also vitamin D as per the endocrinologist. On exam, there are well healing postoperative changes. She is doing very well. Continue with endocrine recs. RTO as needed.

## 2020-11-03 NOTE — PHYSICAL EXAM
[FreeTextEntry1] : voice strong [de-identified] : neck flat, incision clean and dry, minimal redness, no pain. [Midline] : trachea located in midline position [Normal] : no rashes

## 2020-11-03 NOTE — HISTORY OF PRESENT ILLNESS
[de-identified] : 45F here in routine postoperative visit. She is s/p total thyroidectomy 7/17/20 for thyroseq positive left thyroid nodule \par (intermediate-high [70-80%] probability for cancer, NRAS and EIF1AX mutation). Final pathology is negative for malignancy, as below-\par \par Final pathology:\par 1. Thyroid; total thyroidectomy:\par - Noninvasive follicular thyroid neoplasm with papillary-like nuclear features (NIFTP), 2.4 cm in greatest dimension,\par left-sided.\par - Adenomatoid nodules, left-sided.\par 2. Lymph node, level VI; dissection:\par - One lymph node negative for metastatic carcinoma (0/1).\par \par She is doing well since last seen. There is some neck tightness, but improving and she feels well. There is no difficulty eating, breathing, swallowing, talking. No hypocalcemic sx. She is taking weight based levothyroxine and vitamin D as per the endocrinologist.\par \par ROS otherwise unremarkable.

## 2020-11-03 NOTE — CONSULT LETTER
[Dear  ___] : Dear ~STANLEY, [Courtesy Letter:] : I had the pleasure of seeing your patient, [unfilled], in my office today. [Consult Closing:] : Thank you very much for allowing me to participate in the care of this patient.  If you have any questions, please do not hesitate to contact me. [Sincerely,] : Sincerely, [FreeTextEntry3] : Armen Pineda MD\par Department of Otolaryngology - Head and Neck Surgery\par [facility] [DrMichelle  ___] : Dr. BARKER [DrMichelle ___] : Dr. BARKER

## 2020-11-03 NOTE — DATA REVIEWED
[de-identified] : US Neck 5/19/2020:\par \par The right lobe of the thyroid gland is 4.3 x 1.2 x 1.8 cm and appears unremarkable.\par The left lobe of the thyroid gland is 5.6 x 2.0 x 2.6 cm. It contains an isoechoic solid nodule with relatively well-defined margins, measurements are 3.0 x 1.8 x 2.1 cm.\par The thyroid isthmus is 0.4 cm in AP diameter. \par No lymphadenopathy is seen in the neck.\par   [de-identified] : CT NEck 6/9/20:\par FINDINGS:  \par \par Evaluation of portions of the oral cavity, oropharynx and soft tissues at these levels, including the parotid glands, is limited secondary to streak artifact from dental amalgam. Allowing for this, no parotid gland mass is identified. The submandibular glands are unremarkable. There is a heterogeneously enhancing nodule measuring 1.9 cm x 2.0 cm involving the isthmus and left thyroid lobe.\par \par The aerodigestive tract is unremarkable. There is a punctate calcification within the right palatine tonsil, perhaps representing sequela of an old infectious/inflammatory process. There is no soft tissue mass within the neck. The prevertebral soft tissues and epiglottis are unremarkable. There is no jugular chain lymphadenopathy.\par \par The visualized lung apices are unremarkable. There is mild reversal of the normal cervical curvature. The paranasal sinuses and mastoid air cells are aerated.\par \par The orbital structures are unremarkable. The visualized brain parenchyma is unremarkable.\par \par IMPRESSION:  \par \par Heterogeneously enhancing nodule involving the isthmus and left thyroid lobe; otherwise, allowing for streak artifact from dental amalgam, essentially unremarkable CT scan of the neck.

## 2020-11-05 ENCOUNTER — NON-APPOINTMENT (OUTPATIENT)
Age: 45
End: 2020-11-05

## 2020-11-16 ENCOUNTER — NON-APPOINTMENT (OUTPATIENT)
Age: 45
End: 2020-11-16

## 2020-11-16 ENCOUNTER — LABORATORY RESULT (OUTPATIENT)
Age: 45
End: 2020-11-16

## 2020-11-16 ENCOUNTER — APPOINTMENT (OUTPATIENT)
Dept: INTERNAL MEDICINE | Facility: CLINIC | Age: 45
End: 2020-11-16
Payer: COMMERCIAL

## 2020-11-16 VITALS
WEIGHT: 156 LBS | TEMPERATURE: 98.3 F | BODY MASS INDEX: 29.45 KG/M2 | DIASTOLIC BLOOD PRESSURE: 91 MMHG | SYSTOLIC BLOOD PRESSURE: 140 MMHG | OXYGEN SATURATION: 98 % | HEIGHT: 61 IN | HEART RATE: 92 BPM

## 2020-11-16 DIAGNOSIS — Z01.818 ENCOUNTER FOR OTHER PREPROCEDURAL EXAMINATION: ICD-10-CM

## 2020-11-16 PROCEDURE — 93000 ELECTROCARDIOGRAM COMPLETE: CPT

## 2020-11-16 PROCEDURE — 36415 COLL VENOUS BLD VENIPUNCTURE: CPT

## 2020-11-16 PROCEDURE — 99072 ADDL SUPL MATRL&STAF TM PHE: CPT

## 2020-11-16 PROCEDURE — 99214 OFFICE O/P EST MOD 30 MIN: CPT | Mod: 25

## 2020-11-18 ENCOUNTER — TRANSCRIPTION ENCOUNTER (OUTPATIENT)
Age: 45
End: 2020-11-18

## 2020-11-18 LAB
ALBUMIN SERPL ELPH-MCNC: 4.2 G/DL
ALP BLD-CCNC: 71 U/L
ALT SERPL-CCNC: 13 U/L
ANION GAP SERPL CALC-SCNC: 16 MMOL/L
AST SERPL-CCNC: 13 U/L
BASOPHILS # BLD AUTO: 0.03 K/UL
BASOPHILS NFR BLD AUTO: 0.4 %
BILIRUB SERPL-MCNC: 0.2 MG/DL
BUN SERPL-MCNC: 6 MG/DL
CALCIUM SERPL-MCNC: 8.9 MG/DL
CHLORIDE SERPL-SCNC: 97 MMOL/L
CO2 SERPL-SCNC: 22 MMOL/L
CREAT SERPL-MCNC: 0.4 MG/DL
EOSINOPHIL # BLD AUTO: 0.04 K/UL
EOSINOPHIL NFR BLD AUTO: 0.6 %
GLUCOSE SERPL-MCNC: 125 MG/DL
HCT VFR BLD CALC: 39.5 %
HGB BLD-MCNC: 12.8 G/DL
IMM GRANULOCYTES NFR BLD AUTO: 0.3 %
LYMPHOCYTES # BLD AUTO: 1.81 K/UL
LYMPHOCYTES NFR BLD AUTO: 25.8 %
MAN DIFF?: NORMAL
MCHC RBC-ENTMCNC: 28.6 PG
MCHC RBC-ENTMCNC: 32.4 GM/DL
MCV RBC AUTO: 88.4 FL
MONOCYTES # BLD AUTO: 0.46 K/UL
MONOCYTES NFR BLD AUTO: 6.6 %
NEUTROPHILS # BLD AUTO: 4.65 K/UL
NEUTROPHILS NFR BLD AUTO: 66.3 %
PLATELET # BLD AUTO: 229 K/UL
POTASSIUM SERPL-SCNC: 4.2 MMOL/L
PROT SERPL-MCNC: 7.1 G/DL
RBC # BLD: 4.47 M/UL
RBC # FLD: 13.9 %
SODIUM SERPL-SCNC: 136 MMOL/L
WBC # FLD AUTO: 7.01 K/UL

## 2020-11-18 NOTE — PHYSICAL EXAM
[No Respiratory Distress] : no respiratory distress  [Normal] : affect was normal and insight and judgment were intact [de-identified] : tenderness base of right index finger

## 2020-11-18 NOTE — HISTORY OF PRESENT ILLNESS
[No Pertinent Cardiac History] : no history of aortic stenosis, atrial fibrillation, coronary artery disease, recent myocardial infarction, or implantable device/pacemaker [No Pertinent Pulmonary History] : no history of asthma, COPD, sleep apnea, or smoking [No Adverse Anesthesia Reaction] : no adverse anesthesia reaction in self or family member [Chronic Kidney Disease] : no chronic kidney disease [Diabetes] : diabetes [(Patient denies any chest pain, claudication, dyspnea on exertion, orthopnea, palpitations or syncope)] : Patient denies any chest pain, claudication, dyspnea on exertion, orthopnea, palpitations or syncope [Excellent (>10 METs)] : Excellent (>10 METs) [FreeTextEntry1] : excision of the right index volar mass [FreeTextEntry2] : 11/19/2020 [FreeTextEntry3] : Dr. Moffett [FreeTextEntry4] : h/o right index finger mass causing discomfort and pain when gripping. Scheduled for surgical excision

## 2020-11-19 ENCOUNTER — APPOINTMENT (OUTPATIENT)
Dept: ORTHOPEDIC SURGERY | Facility: AMBULATORY SURGERY CENTER | Age: 45
End: 2020-11-19
Payer: COMMERCIAL

## 2020-11-19 ENCOUNTER — RESULT REVIEW (OUTPATIENT)
Age: 45
End: 2020-11-19

## 2020-11-19 ENCOUNTER — OUTPATIENT (OUTPATIENT)
Dept: OUTPATIENT SERVICES | Facility: HOSPITAL | Age: 45
LOS: 1 days | Discharge: HOME | End: 2020-11-19
Payer: COMMERCIAL

## 2020-11-19 DIAGNOSIS — Z87.442 PERSONAL HISTORY OF URINARY CALCULI: Chronic | ICD-10-CM

## 2020-11-19 DIAGNOSIS — Z98.890 OTHER SPECIFIED POSTPROCEDURAL STATES: Chronic | ICD-10-CM

## 2020-11-19 DIAGNOSIS — Z98.891 HISTORY OF UTERINE SCAR FROM PREVIOUS SURGERY: Chronic | ICD-10-CM

## 2020-11-19 LAB — GLUCOSE BLDC GLUCOMTR-MCNC: 171 MG/DL — HIGH (ref 70–99)

## 2020-11-19 PROCEDURE — 26160 REMOVE TENDON SHEATH LESION: CPT | Mod: F6

## 2020-11-19 PROCEDURE — 88304 TISSUE EXAM BY PATHOLOGIST: CPT | Mod: 26

## 2020-11-19 PROCEDURE — 26548 RECONSTRUCT FINGER JOINT: CPT | Mod: RT

## 2020-11-30 ENCOUNTER — APPOINTMENT (OUTPATIENT)
Dept: ENDOCRINOLOGY | Facility: CLINIC | Age: 45
End: 2020-11-30

## 2020-12-01 ENCOUNTER — APPOINTMENT (OUTPATIENT)
Dept: ENDOCRINOLOGY | Facility: CLINIC | Age: 45
End: 2020-12-01

## 2020-12-01 LAB — SURGICAL PATHOLOGY STUDY: SIGNIFICANT CHANGE UP

## 2020-12-02 ENCOUNTER — NON-APPOINTMENT (OUTPATIENT)
Age: 45
End: 2020-12-02

## 2020-12-02 ENCOUNTER — APPOINTMENT (OUTPATIENT)
Dept: ORTHOPEDIC SURGERY | Facility: CLINIC | Age: 45
End: 2020-12-02
Payer: COMMERCIAL

## 2020-12-02 PROCEDURE — 99024 POSTOP FOLLOW-UP VISIT: CPT

## 2020-12-02 RX ORDER — ACETAMINOPHEN AND CODEINE 300; 30 MG/1; MG/1
300-30 TABLET ORAL
Qty: 20 | Refills: 0 | Status: DISCONTINUED | COMMUNITY
Start: 2020-11-18 | End: 2020-12-02

## 2021-01-05 ENCOUNTER — APPOINTMENT (OUTPATIENT)
Dept: ENDOCRINOLOGY | Facility: CLINIC | Age: 46
End: 2021-01-05
Payer: COMMERCIAL

## 2021-01-05 VITALS — WEIGHT: 162 LBS | BODY MASS INDEX: 30.61 KG/M2

## 2021-01-05 PROCEDURE — 99072 ADDL SUPL MATRL&STAF TM PHE: CPT

## 2021-01-05 PROCEDURE — 99211 OFF/OP EST MAY X REQ PHY/QHP: CPT

## 2021-01-05 PROCEDURE — 97803 MED NUTRITION INDIV SUBSEQ: CPT

## 2021-01-19 ENCOUNTER — APPOINTMENT (OUTPATIENT)
Dept: ENDOCRINOLOGY | Facility: CLINIC | Age: 46
End: 2021-01-19
Payer: COMMERCIAL

## 2021-01-19 ENCOUNTER — APPOINTMENT (OUTPATIENT)
Dept: ORTHOPEDIC SURGERY | Facility: CLINIC | Age: 46
End: 2021-01-19
Payer: COMMERCIAL

## 2021-01-19 VITALS
BODY MASS INDEX: 30.21 KG/M2 | HEIGHT: 61 IN | SYSTOLIC BLOOD PRESSURE: 125 MMHG | HEART RATE: 95 BPM | DIASTOLIC BLOOD PRESSURE: 81 MMHG | WEIGHT: 160 LBS

## 2021-01-19 DIAGNOSIS — D48.1 NEOPLASM OF UNCERTAIN BEHAVIOR OF CONNECTIVE AND OTHER SOFT TISSUE: ICD-10-CM

## 2021-01-19 LAB
GLUCOSE BLDC GLUCOMTR-MCNC: 95
HBA1C MFR BLD HPLC: 6.7

## 2021-01-19 PROCEDURE — 99024 POSTOP FOLLOW-UP VISIT: CPT

## 2021-01-19 PROCEDURE — 99072 ADDL SUPL MATRL&STAF TM PHE: CPT

## 2021-01-19 PROCEDURE — 99214 OFFICE O/P EST MOD 30 MIN: CPT | Mod: 25

## 2021-01-19 PROCEDURE — 83036 HEMOGLOBIN GLYCOSYLATED A1C: CPT | Mod: QW

## 2021-01-19 PROCEDURE — 82962 GLUCOSE BLOOD TEST: CPT

## 2021-01-27 ENCOUNTER — NON-APPOINTMENT (OUTPATIENT)
Age: 46
End: 2021-01-27

## 2021-01-27 LAB
25(OH)D3 SERPL-MCNC: 19.4 NG/ML
ALBUMIN SERPL ELPH-MCNC: 4.5 G/DL
ALP BLD-CCNC: 61 U/L
ALT SERPL-CCNC: 15 U/L
ANION GAP SERPL CALC-SCNC: 13 MMOL/L
AST SERPL-CCNC: 12 U/L
BILIRUB SERPL-MCNC: 0.4 MG/DL
BUN SERPL-MCNC: 8 MG/DL
CALCIUM SERPL-MCNC: 9 MG/DL
CHLORIDE SERPL-SCNC: 101 MMOL/L
CHOLEST SERPL-MCNC: 266 MG/DL
CO2 SERPL-SCNC: 24 MMOL/L
CREAT SERPL-MCNC: 0.63 MG/DL
CREAT SPEC-SCNC: 24 MG/DL
GLUCOSE SERPL-MCNC: 171 MG/DL
HDLC SERPL-MCNC: 58 MG/DL
LDLC SERPL CALC-MCNC: 189 MG/DL
MICROALBUMIN 24H UR DL<=1MG/L-MCNC: <1.2 MG/DL
MICROALBUMIN/CREAT 24H UR-RTO: NORMAL MG/G
NONHDLC SERPL-MCNC: 207 MG/DL
POTASSIUM SERPL-SCNC: 4.3 MMOL/L
PROT SERPL-MCNC: 7.3 G/DL
SODIUM SERPL-SCNC: 137 MMOL/L
TRIGL SERPL-MCNC: 91 MG/DL
TSH SERPL-ACNC: 0.46 UIU/ML
VIT B12 SERPL-MCNC: 437 PG/ML

## 2021-01-27 NOTE — HISTORY OF PRESENT ILLNESS
[FreeTextEntry1] : Ms. 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 I saw her for an initial visit in August 2020 and last in October.\par \par Type 2 diabetes mellitus. Point-of-care HbA1c 6.7% and blood glucose 95 mg/dL today; HbA1c 7.3% in October 2020, 8.3% in July 2020. No known history of micro- or macrovascular complications. Her mother and father have a history of diabetes.\par She was diagnosed with diabetes in July 2020 by hemoglobin A1c. She had a history of gestational diabetes with her three pregnancies. No hospitalizations for hypo- or hyperglycemia.\par She has been taking metformin 500 mg twice daily\par She has been checking blood sugars a few times a week. Fasting blood sugars 120-160s mg/dL.\par She is not on a blood pressure regimen\par She is on a statin for cholesterol\par Nephrology screening: Urine microalbumin within range in August 2020\par Last ophthalmology appointment: Over a year; upcoming appointment in February\par Last dental appointment: July 2020\par She is up-to-date with influenza vaccine\par \par Non-invasive follicular thyroid 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 Postoperative hypothyroidism.\par She is status post total thyroidectomy in July 2020 for a Thyroseq positive left thyroid nodule. 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 \par She is taking levothyroxine in the morning, on an empty stomach, with plain water, and waiting at least 30 minutes before eating. She is not taking calcium/iron/multivitamin. \par No history of radiation exposure.\par No known family history of thyroid cancer.\par \par Interim History \par Laboratory results from last visit as below. TSH 0.06 uIU/mL and recommended adjustment in levothyroxine from 112 to 100 mcg daily. Electrolytes and iron studies within range. Other test results within range. \par We started rosuvastatin 40 mg daily last visit. She is tolerating well. 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 \par She has seen Radha Worley and Berlin Moffett; notes reviewed. She has seen Gisele Coto RN/diabetes educator and Ruchi Leonard RD/nutrition. \par Her son lost 40 pounds since his diagnosis of diabetes and now has insurance. 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 \par Stress is improved. She still has occasional loose stools with metformin. Weight is up 5 pounds since last visit. No chest pain, shortness of breath, polyuria/polydipsia, lower extremity numbness/tingling. She has a Mirena intrauterine device in place; she may remove for tubal ligation instead.\par Medical and surgical history, medications, allergies, social and family history reviewed and updated as needed.

## 2021-01-27 NOTE — ADDENDUM
[FreeTextEntry1] : Recent laboratory results as below; discussed with Ms. Pablo. Lipid panel not at goal; I recommended compliance with rosuvastatin and referral to preventative cardiology for further management. Vitamin D remained low despite ergocalciferol 50,000 intl units every two weeks and recommended weekly. Urine microalbumin within range. Other test results within range. 1/27/21

## 2021-01-27 NOTE — PHYSICAL EXAM
[Alert] : alert [Healthy Appearance] : healthy appearance [No Acute Distress] : no acute distress [Normal Sclera/Conjunctiva] : normal sclera/conjunctiva [Well Healed Scar] : well healed scar [No Respiratory Distress] : no respiratory distress [Clear to Auscultation] : lungs were clear to auscultation bilaterally [Normal S1, S2] : normal S1 and S2 [Normal Rate] : heart rate was normal [Regular Rhythm] : with a regular rhythm [No Stigmata of Cushings Syndrome] : no stigmata of Cushings Syndrome [Normal Gait] : normal gait [Acanthosis Nigricans] : acanthosis nigricans present [Normal Insight/Judgement] : insight and judgment were intact [Normal Hearing] : hearing was normal [Kyphosis] : no kyphosis present [de-identified] : no moon facies, no supraclavicular fat pads [de-identified] : Foot examination performed in October 2020

## 2021-01-27 NOTE — ASSESSMENT
[FreeTextEntry1] : Type 2 diabetes mellitus/elevated body mass index. Point-of-care HbA1c 6.7% and blood glucose 95 mg/dL today; HbA1c 7.3% in October 2020. No known history of micro- or macrovascular complications. I congratulated her on her excellent glycemic control. We discussed the cardiovascular and microvascular complications of uncontrolled diabetes. We discussed the importance of diet and exercise and lifestyle modification for glycemic control and weight loss. We discussed follow-up with nutrition. We discussed pharmacologic options for glycemic control and weight loss. She declines switching from immediate to extended release metformin or a trial of a GLP-1 receptor agonist at this time. \par Continue metformin 500 mg twice daily \par Check blood sugars daily, fasting or postprandial\par She is not on a blood pressure regimen; blood pressure around goal\par She is on a statin for cholesterol; check lipid panel after initiation of rosuvastatin\par Nephrology screening: Urine microalbumin within range in August 2020\par Last ophthalmology appointment: Over a year; upcoming appointment in February\par Last dental appointment: July 2020\par She is up-to-date with influenza vaccine\par \par Non-invasive follicular thyroid 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 Postoperative hypothyroidism. She is status post total thyroidectomy in July 2020 for a Thyroseq positive left thyroid nodule. 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 No further evaluation is needed since her diagnosis is not thyroid cancer, however, we can consider monitoring as needed. We reviewed proper use and compliance with levothyroxine. \par Continue levothyroxine 100 mcg daily pending TSH level\par \par Vitamin D deficiency. Continue ergocalciferol 50,000 intl units every two weeks pending level.\par \par Hair loss. Evaluation last visit overall unremarkable. We discussed that first line therapy for hair loss is topical minoxidil. \par \par Return to see me in 4 months. Patient advised to call earlier with significant hypo- or hyperglycemia.

## 2021-01-28 ENCOUNTER — RX RENEWAL (OUTPATIENT)
Age: 46
End: 2021-01-28

## 2021-03-25 ENCOUNTER — APPOINTMENT (OUTPATIENT)
Dept: ENDOCRINOLOGY | Facility: CLINIC | Age: 46
End: 2021-03-25

## 2021-04-12 ENCOUNTER — RX RENEWAL (OUTPATIENT)
Age: 46
End: 2021-04-12

## 2021-06-17 ENCOUNTER — LABORATORY RESULT (OUTPATIENT)
Age: 46
End: 2021-06-17

## 2021-06-17 ENCOUNTER — RESULT REVIEW (OUTPATIENT)
Age: 46
End: 2021-06-17

## 2021-06-17 ENCOUNTER — APPOINTMENT (OUTPATIENT)
Dept: INTERNAL MEDICINE | Facility: CLINIC | Age: 46
End: 2021-06-17
Payer: COMMERCIAL

## 2021-06-17 ENCOUNTER — NON-APPOINTMENT (OUTPATIENT)
Age: 46
End: 2021-06-17

## 2021-06-17 VITALS
DIASTOLIC BLOOD PRESSURE: 105 MMHG | HEART RATE: 91 BPM | BODY MASS INDEX: 30.21 KG/M2 | HEIGHT: 61 IN | TEMPERATURE: 98.8 F | OXYGEN SATURATION: 96 % | SYSTOLIC BLOOD PRESSURE: 141 MMHG | WEIGHT: 160 LBS

## 2021-06-17 DIAGNOSIS — R14.0 ABDOMINAL DISTENSION (GASEOUS): ICD-10-CM

## 2021-06-17 DIAGNOSIS — R10.12 LEFT UPPER QUADRANT PAIN: ICD-10-CM

## 2021-06-17 PROCEDURE — 99072 ADDL SUPL MATRL&STAF TM PHE: CPT

## 2021-06-17 PROCEDURE — 83014 H PYLORI DRUG ADMIN: CPT

## 2021-06-17 PROCEDURE — 36415 COLL VENOUS BLD VENIPUNCTURE: CPT

## 2021-06-17 PROCEDURE — 99214 OFFICE O/P EST MOD 30 MIN: CPT | Mod: 25

## 2021-06-17 RX ORDER — SERTRALINE HYDROCHLORIDE 50 MG/1
50 TABLET, FILM COATED ORAL
Qty: 90 | Refills: 0 | Status: COMPLETED | COMMUNITY
Start: 2020-11-16 | End: 2021-06-17

## 2021-06-17 NOTE — PHYSICAL EXAM
[No Respiratory Distress] : no respiratory distress  [Soft] : abdomen soft [Normal] : affect was normal and insight and judgment were intact [de-identified] : LUQ tenderness to palpation

## 2021-06-17 NOTE — HISTORY OF PRESENT ILLNESS
[FreeTextEntry8] : LUQ pain\par - sharp stabbing left upper quadrant pain, a/w distention, burning, burping in the back of the throat \par - concerned for kidney stones\par \par LUE pain/swelling\par - intermittent arm will swell up a/w electric shooting sensation \par - denies neck pain\par \par

## 2021-06-25 LAB
ALBUMIN SERPL ELPH-MCNC: 4.5 G/DL
ALP BLD-CCNC: 62 U/L
ALT SERPL-CCNC: 15 U/L
ANION GAP SERPL CALC-SCNC: 13 MMOL/L
AST SERPL-CCNC: 10 U/L
BILIRUB SERPL-MCNC: 0.2 MG/DL
BUN SERPL-MCNC: 10 MG/DL
CALCIUM SERPL-MCNC: 9.4 MG/DL
CELIACPAN: NORMAL
CHLORIDE SERPL-SCNC: 104 MMOL/L
CHOLEST SERPL-MCNC: 265 MG/DL
CO2 SERPL-SCNC: 24 MMOL/L
CREAT SERPL-MCNC: 0.57 MG/DL
ESTIMATED AVERAGE GLUCOSE: 174 MG/DL
GLUCOSE SERPL-MCNC: 153 MG/DL
HBA1C MFR BLD HPLC: 7.7 %
HDLC SERPL-MCNC: 49 MG/DL
LDLC SERPL CALC-MCNC: 189 MG/DL
NONHDLC SERPL-MCNC: 216 MG/DL
POTASSIUM SERPL-SCNC: 4.2 MMOL/L
PROT SERPL-MCNC: 7.7 G/DL
SODIUM SERPL-SCNC: 141 MMOL/L
TRIGL SERPL-MCNC: 135 MG/DL
UREA BREATH TEST QL: NEGATIVE
VIT B12 SERPL-MCNC: 498 PG/ML

## 2021-06-28 LAB
TTG IGA SER IA-ACNC: <1.2 U/ML
TTG IGA SER-ACNC: NEGATIVE
TTG IGG SER IA-ACNC: 4.6 U/ML
TTG IGG SER IA-ACNC: NEGATIVE

## 2021-07-05 ENCOUNTER — RX RENEWAL (OUTPATIENT)
Age: 46
End: 2021-07-05

## 2021-07-14 ENCOUNTER — APPOINTMENT (OUTPATIENT)
Dept: ENDOCRINOLOGY | Facility: CLINIC | Age: 46
End: 2021-07-14
Payer: COMMERCIAL

## 2021-07-14 VITALS
SYSTOLIC BLOOD PRESSURE: 149 MMHG | HEART RATE: 103 BPM | DIASTOLIC BLOOD PRESSURE: 81 MMHG | WEIGHT: 161 LBS | BODY MASS INDEX: 30.42 KG/M2

## 2021-07-14 LAB — GLUCOSE BLDC GLUCOMTR-MCNC: 103

## 2021-07-14 PROCEDURE — 99072 ADDL SUPL MATRL&STAF TM PHE: CPT

## 2021-07-14 PROCEDURE — 82962 GLUCOSE BLOOD TEST: CPT | Mod: NC

## 2021-07-14 PROCEDURE — 97803 MED NUTRITION INDIV SUBSEQ: CPT

## 2021-07-14 PROCEDURE — 99214 OFFICE O/P EST MOD 30 MIN: CPT | Mod: 25

## 2021-07-14 RX ORDER — LEVONORGESTREL 52 MG/1
INTRAUTERINE DEVICE INTRAUTERINE
Refills: 0 | Status: ACTIVE | COMMUNITY

## 2021-07-14 RX ORDER — ATORVASTATIN CALCIUM 40 MG/1
40 TABLET, FILM COATED ORAL
Qty: 90 | Refills: 3 | Status: DISCONTINUED | COMMUNITY
Start: 2020-05-24 | End: 2021-07-14

## 2021-07-14 NOTE — HISTORY OF PRESENT ILLNESS
[FreeTextEntry1] : Ms. 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 I saw her for an initial visit in August 2020 and last in January 2021.\par \par Type 2 diabetes mellitus. HbA1c 7.7% in June 2021 and glucose 103 mg/dL today; HbA1c 6.7% in January 2021, 7.3% in October 2020, 8.3% in July 2020. No known history of micro- or macrovascular complications. Her mother and father have a history of diabetes.\par She was diagnosed with diabetes in July 2020 by hemoglobin A1c. She had a history of gestational diabetes with her three pregnancies. 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 Postoperative hypothyroidism.\par She is status post total thyroidectomy in July 2020 for a Thyroseq positive left thyroid nodule. 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 \par She is taking levothyroxine in the morning, on an empty stomach, with plain water, and waiting at least 30 minutes before eating. She is not taking calcium/iron/multivitamin. \par No history of radiation exposure.\par No known family history of thyroid cancer.\par \par Interim History \par Laboratory results from last visit as below. Lipid panel not at goal; I recommended compliance with rosuvastatin and referral to preventative cardiology for further management. Vitamin D remained low despite ergocalciferol 50,000 intl units every two weeks and recommended weekly. Urine microalbumin within range. Other test results within range. \par She was off metformin for a few months due to loose stools; she has restarted since June and is tolerating well. \par She has had left upper quadrant pain and is scheduled with gastroenterology. Recent laboratory results reviewed. 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 Her father had recent cardiac surgery and is being brought to the hospital again.\par Her son lost 40 pounds since his diagnosis of diabetes and now has insurance. \par Weight is down 1 pound since last visit. No chest pain, shortness of breath, polyuria/polydipsia, lower extremity numbness/tingling. She has a Mirena intrauterine device in place; her  will be getting a vasectomy.\par Medical and surgical history, medications, allergies, social and family history reviewed and updated as needed.

## 2021-07-14 NOTE — ASSESSMENT
[FreeTextEntry1] : Type 2 diabetes mellitus/elevated body mass index. HbA1c 7.7% in June 2021 and glucose 103 mg/dL today; HbA1c 6.7% in January 2021, 7.3% in October 2020, 8.3% in July 2020. No known history of micro- or macrovascular complications. We discussed the cardiovascular and microvascular complications of uncontrolled diabetes. We discussed the importance of diet and exercise and lifestyle modification for glycemic control and weight loss. We discussed follow-up with nutrition. We discussed pharmacologic options for glycemic control and weight loss. She declines switching from immediate to extended release metformin or a trial of a GLP-1 receptor agonist at this time; she will consider a GLP-1 receptor agonist for next visit. \par Continue metformin 500 mg twice daily \par Check blood sugars daily, fasting or postprandial\par She is not on a blood pressure regimen; blood pressure around goal\par She is on a statin for cholesterol; encouraged compliance and appointment with cardiology\par Nephropathy screening: Urine microalbumin within range in January 2021\par Last ophthalmology appointment: February 2021\par Last dental appointment: Over six months; advised appointment\par She is up-to-date with COVID-19 (Pfizer) vaccine\par \par Non-invasive follicular thyroid 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 Postoperative hypothyroidism. She is status post total thyroidectomy in July 2020 for a Thyroseq positive left thyroid nodule. 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 No further evaluation is needed since her diagnosis is not thyroid cancer, however, we can consider monitoring as needed. She has been clinically and biochemically euthyroid on her current regimen. We reviewed proper use and compliance with levothyroxine. \par Continue levothyroxine 100 mcg daily\par \par Vitamin D deficiency. Continue ergocalciferol 50,000 intl units weekly.\par \par Return to see me in 3 months. Patient advised to call earlier with significant hypo- or hyperglycemia.

## 2021-07-14 NOTE — PHYSICAL EXAM
[Alert] : alert [Healthy Appearance] : healthy appearance [No Acute Distress] : no acute distress [Normal Sclera/Conjunctiva] : normal sclera/conjunctiva [Normal Hearing] : hearing was normal [Well Healed Scar] : well healed scar [No Respiratory Distress] : no respiratory distress [No Stigmata of Cushings Syndrome] : no stigmata of Cushings Syndrome [Normal Gait] : normal gait [Acanthosis Nigricans] : acanthosis nigricans present [Normal Insight/Judgement] : insight and judgment were intact [Kyphosis] : no kyphosis present [de-identified] : no moon facies, no supraclavicular fat pads [de-identified] : Foot examination performed in October 2020

## 2021-07-23 NOTE — ASU PATIENT PROFILE, ADULT - PRO ARRIVE FROM
home Protopic Pregnancy And Lactation Text: This medication is Pregnancy Category C. It is unknown if this medication is excreted in breast milk when applied topically.

## 2021-08-05 ENCOUNTER — APPOINTMENT (OUTPATIENT)
Dept: HEART AND VASCULAR | Facility: CLINIC | Age: 46
End: 2021-08-05
Payer: COMMERCIAL

## 2021-08-05 VITALS
HEIGHT: 61 IN | TEMPERATURE: 98 F | BODY MASS INDEX: 30.21 KG/M2 | WEIGHT: 160 LBS | SYSTOLIC BLOOD PRESSURE: 146 MMHG | DIASTOLIC BLOOD PRESSURE: 85 MMHG | HEART RATE: 79 BPM

## 2021-08-05 VITALS — SYSTOLIC BLOOD PRESSURE: 131 MMHG | DIASTOLIC BLOOD PRESSURE: 84 MMHG

## 2021-08-05 DIAGNOSIS — R07.9 CHEST PAIN, UNSPECIFIED: ICD-10-CM

## 2021-08-05 PROCEDURE — 93000 ELECTROCARDIOGRAM COMPLETE: CPT

## 2021-08-05 NOTE — DISCUSSION/SUMMARY
[Patient] : the patient [___ Week(s)] : in [unfilled] week(s) [FreeTextEntry1] : \par 45 y/o female with h/o obesity, dm, gerd, hl, hypothyroid, htn, migraine, eclampsia who presents for initial evaluation of htn.\par \par -ekg ordered today - nsr, normal intervals, no st/t changes\par -labs 2021 reviewed\par -ordered CAYLA w doppler and secondary w/up htn labs\par -consider marnie eval\par -counseled on nutrition and weight loss, exercise, cvd lifestyle changes\par -start asa 81 mg qd \par -continue crestor\par -ordered CTA for cp\par -ordered Echo for cp\par -agree with gi eval for gerd/cp\par -start lisinopril 5 mg qd\par -f/up 3 weeks for htn, bmp\par \par I have spent 60 minutes reviewing labs, records, tests and discussing bp medication, statin, cvd risk factors management\par

## 2021-08-05 NOTE — HISTORY OF PRESENT ILLNESS
[FreeTextEntry1] : \par \par 45 y/o female with h/o obesity, dm, gerd, hl, hypothyroid, htn, migraine, eclampsia who presents for initial evaluation of htn.\par \par \par notes recent chest pain three times in past 6 months, nonradiating, 8/10, last minutes\par associated with sob, nausea, diaphoresis, brought about by stress\par \par no sob, syncope, edema, palpitations, orthopnea, pnd\par notes some LH\par \par no snoring\par \par diagnosed w dm 2020\par h/o gestational dm with 2 pregnancies and h/o eclampsia with fourth pregnancy\par \par walking but not actively exercising\par working with nutritionist\par \par \par never had cv testing\par \par \par PMH/PSH:\par obesity\par type 2 dm\par gerd\par hl\par hypothyroid\par htn\par follicular neoplasm thyroid\par vit d defciency\par migraine\par s/p thyroidectomy 2020\par s/p c section\par s/p renal lithotripsy\par eclampsia\par nephrolithiasis\par right hand surgery 2021\par \par \par ALL:\par nkda\par \par \par MEDS:\par levothyroxine 100 mcg qd\par metformin 500 mg bid\par mirena iud\par crestor 40 mg qhs\par vit D\par \par \par SH:\par no tobacco/etoh/drugs\par nypd traffic \par 4 children - oldest son w DM age 29\par \par from [place]\par \par \par \par FH:\par father - dm, hodgkin's lymphoma, htn, hl, cad s/p pci 60 - alive, 67\par mother - dm, alive, htn, hl 62

## 2021-08-09 ENCOUNTER — NON-APPOINTMENT (OUTPATIENT)
Age: 46
End: 2021-08-09

## 2021-08-13 ENCOUNTER — APPOINTMENT (OUTPATIENT)
Dept: INTERNAL MEDICINE | Facility: CLINIC | Age: 46
End: 2021-08-13

## 2021-09-15 ENCOUNTER — OUTPATIENT (OUTPATIENT)
Dept: OUTPATIENT SERVICES | Facility: HOSPITAL | Age: 46
LOS: 1 days | End: 2021-09-15
Payer: COMMERCIAL

## 2021-09-15 ENCOUNTER — APPOINTMENT (OUTPATIENT)
Dept: ULTRASOUND IMAGING | Facility: HOSPITAL | Age: 46
End: 2021-09-15

## 2021-09-15 ENCOUNTER — RESULT REVIEW (OUTPATIENT)
Age: 46
End: 2021-09-15

## 2021-09-15 ENCOUNTER — APPOINTMENT (OUTPATIENT)
Dept: CT IMAGING | Facility: HOSPITAL | Age: 46
End: 2021-09-15

## 2021-09-15 DIAGNOSIS — Z98.890 OTHER SPECIFIED POSTPROCEDURAL STATES: Chronic | ICD-10-CM

## 2021-09-15 DIAGNOSIS — Z98.891 HISTORY OF UTERINE SCAR FROM PREVIOUS SURGERY: Chronic | ICD-10-CM

## 2021-09-15 DIAGNOSIS — Z87.442 PERSONAL HISTORY OF URINARY CALCULI: Chronic | ICD-10-CM

## 2021-09-15 PROCEDURE — 74018 RADEX ABDOMEN 1 VIEW: CPT | Mod: 26

## 2021-09-15 PROCEDURE — 76700 US EXAM ABDOM COMPLETE: CPT | Mod: 26,59

## 2021-09-15 PROCEDURE — 75574 CT ANGIO HRT W/3D IMAGE: CPT | Mod: 26

## 2021-09-15 PROCEDURE — 93976 VASCULAR STUDY: CPT | Mod: 26

## 2021-09-23 ENCOUNTER — NON-APPOINTMENT (OUTPATIENT)
Age: 46
End: 2021-09-23

## 2021-09-30 ENCOUNTER — APPOINTMENT (OUTPATIENT)
Dept: HEART AND VASCULAR | Facility: CLINIC | Age: 46
End: 2021-09-30

## 2021-10-14 ENCOUNTER — FORM ENCOUNTER (OUTPATIENT)
Age: 46
End: 2021-10-14

## 2021-10-14 ENCOUNTER — APPOINTMENT (OUTPATIENT)
Dept: ENDOCRINOLOGY | Facility: CLINIC | Age: 46
End: 2021-10-14

## 2021-10-15 ENCOUNTER — OUTPATIENT (OUTPATIENT)
Dept: OUTPATIENT SERVICES | Facility: HOSPITAL | Age: 46
LOS: 1 days | End: 2021-10-15
Payer: COMMERCIAL

## 2021-10-15 DIAGNOSIS — Z98.891 HISTORY OF UTERINE SCAR FROM PREVIOUS SURGERY: Chronic | ICD-10-CM

## 2021-10-15 DIAGNOSIS — Z98.890 OTHER SPECIFIED POSTPROCEDURAL STATES: Chronic | ICD-10-CM

## 2021-10-15 DIAGNOSIS — R07.9 CHEST PAIN, UNSPECIFIED: ICD-10-CM

## 2021-10-15 DIAGNOSIS — Z87.442 PERSONAL HISTORY OF URINARY CALCULI: Chronic | ICD-10-CM

## 2021-10-15 PROCEDURE — 93306 TTE W/DOPPLER COMPLETE: CPT | Mod: 26

## 2021-10-19 ENCOUNTER — EMERGENCY (EMERGENCY)
Facility: HOSPITAL | Age: 46
LOS: 1 days | Discharge: HOME | End: 2021-10-19
Admitting: EMERGENCY MEDICINE
Payer: OTHER MISCELLANEOUS

## 2021-10-19 VITALS
TEMPERATURE: 99 F | OXYGEN SATURATION: 97 % | DIASTOLIC BLOOD PRESSURE: 82 MMHG | WEIGHT: 167.99 LBS | HEIGHT: 61 IN | HEART RATE: 71 BPM | SYSTOLIC BLOOD PRESSURE: 168 MMHG | RESPIRATION RATE: 18 BRPM

## 2021-10-19 VITALS
RESPIRATION RATE: 16 BRPM | SYSTOLIC BLOOD PRESSURE: 159 MMHG | OXYGEN SATURATION: 96 % | DIASTOLIC BLOOD PRESSURE: 88 MMHG | HEART RATE: 67 BPM

## 2021-10-19 DIAGNOSIS — M79.671 PAIN IN RIGHT FOOT: ICD-10-CM

## 2021-10-19 DIAGNOSIS — Z98.891 HISTORY OF UTERINE SCAR FROM PREVIOUS SURGERY: Chronic | ICD-10-CM

## 2021-10-19 DIAGNOSIS — Z87.442 PERSONAL HISTORY OF URINARY CALCULI: Chronic | ICD-10-CM
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
## 2021-10-19 DIAGNOSIS — Z87.442 PERSONAL HISTORY OF URINARY CALCULI: ICD-10-CM

## 2021-10-19 DIAGNOSIS — E11.9 TYPE 2 DIABETES MELLITUS WITHOUT COMPLICATIONS: ICD-10-CM
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
## 2021-10-19 DIAGNOSIS — Z98.890 OTHER SPECIFIED POSTPROCEDURAL STATES: Chronic | ICD-10-CM

## 2021-10-19 PROCEDURE — 99283 EMERGENCY DEPT VISIT LOW MDM: CPT

## 2021-10-19 PROCEDURE — 73630 X-RAY EXAM OF FOOT: CPT | Mod: 26,RT

## 2021-10-19 RX ORDER — IBUPROFEN 200 MG
600 TABLET ORAL ONCE
Refills: 0 | Status: COMPLETED | OUTPATIENT
Start: 2021-10-19 | End: 2021-10-19

## 2021-10-19 RX ADMIN — Medication 600 MILLIGRAM(S): at 11:04

## 2021-10-19 RX ADMIN — Medication 600 MILLIGRAM(S): at 12:15

## 2021-10-19 NOTE — ED PROVIDER NOTE - NSFOLLOWUPCLINICS_GEN_ALL_ED_FT
[facility] - Podiatry Clinic  Podiatry  [address] [place] [address]  Phone: [phone]  Fax:

## 2021-10-19 NOTE — ED PROVIDER NOTE - CLINICAL SUMMARY MEDICAL DECISION MAKING FREE TEXT BOX
47 y/o F present with right foot pain after stepping off the bus. Pt is ambulatory. X ray right foot with results. 45 y/o F present with right foot pain after stepping off the bus. Pt is ambulatory. Plan x ray right foot, give ibuprofen for pain. Follow up with results.

## 2021-10-19 NOTE — ED PROVIDER NOTE - CPE EDP NEURO NORM
[FreeTextEntry1] : alcoholism, new patient [de-identified] : CHRIS OSORIO is a 60 year old F who presents today for alcoholism. Last drank last night having 7 beers per day. Has been in treatment in the past. Will be going to [facility]. Denies any use of cocaine, opiates, or marijuana.  normal...

## 2021-10-19 NOTE — ED ADULT NURSE NOTE - NSICDXPASTSURGICALHX_GEN_ALL_CORE_FT
PAST SURGICAL HISTORY:  H/O ovarian cystectomy right    H/O:  section     History of kidney stones

## 2021-10-19 NOTE — ED ADULT TRIAGE NOTE - CHIEF COMPLAINT QUOTE
Pt 'limped' into ED c/o R foot pain. Endorses jumping off [employer] bus yesterday and placing too much pressure on right foot. Denies ankle rolling. Now endorses pain and mild swelling.

## 2021-10-19 NOTE — ED PROVIDER NOTE - PATIENT PORTAL LINK FT
You can access the FollowMyHealth Patient Portal offered by [facility] by registering at the following website: [email or web address]. By joining [facility]’s FollowMyHealth portal, you will also be able to view your health information using other applications (apps) compatible with our system.

## 2021-10-19 NOTE — ED ADULT NURSE NOTE - OBJECTIVE STATEMENT
pt reports exiting a city bus yesterday and landing hard on right foot.  pt denies rolling the ankle.  pt states today has pain on bottom of right foot on the arch.

## 2021-10-19 NOTE — ED ADULT NURSE NOTE - NSICDXPASTMEDICALHX_GEN_ALL_CORE_FT
PAST MEDICAL HISTORY:  Diabetes     Dysphagia     Eclampsia     Neoplasm of endocrine gland     Nephrolithiasis

## 2021-10-19 NOTE — ED ADULT NURSE NOTE - NSIMPLEMENTINTERV_GEN_ALL_ED
Implemented All Universal Safety Interventions:  [place] to call system. Call bell, personal items and telephone within reach. Instruct patient to call for assistance. Room bathroom lighting operational. Non-slip footwear when patient is off stretcher. Physically safe environment: no spills, clutter or unnecessary equipment. Stretcher in lowest position, wheels locked, appropriate side rails in place.

## 2021-10-19 NOTE — ED PROVIDER NOTE - OBJECTIVE STATEMENT
47 y/o F presents with right foot pain. Pain started after stepping off the bus. Pt reports feeling a ball like pain by the heel. No numbness or tingling. 47 y/o F presents with right foot pain. Pain started after stepping off the bus. Pt reports feeling a "ball" by the right heel. No numbness or tingling. Has not taken any medication for pain.

## 2021-10-19 NOTE — ED PROVIDER NOTE - MUSCULOSKELETAL, MLM
mid arch right foot tenderness, no tenderness of the achilles or dorsal foot mild arch right foot tenderness towards the medial aspect, no tenderness of the area of the achilles, no dorsal foot tenderness, no deformities or swelling, full ROM of the foot and ankle. Extremities NVI. DP/PT pulses 2+.

## 2021-11-08 ENCOUNTER — APPOINTMENT (OUTPATIENT)
Dept: HEART AND VASCULAR | Facility: CLINIC | Age: 46
End: 2021-11-08

## 2021-11-15 ENCOUNTER — APPOINTMENT (OUTPATIENT)
Dept: HEART AND VASCULAR | Facility: CLINIC | Age: 46
End: 2021-11-15

## 2021-11-16 ENCOUNTER — APPOINTMENT (OUTPATIENT)
Dept: INTERNAL MEDICINE | Facility: CLINIC | Age: 46
End: 2021-11-16

## 2022-01-05 ENCOUNTER — APPOINTMENT (OUTPATIENT)
Dept: ENDOCRINOLOGY | Facility: CLINIC | Age: 47
End: 2022-01-05

## 2022-03-30 ENCOUNTER — RX RENEWAL (OUTPATIENT)
Age: 47
End: 2022-03-30

## 2022-06-02 ENCOUNTER — RX RENEWAL (OUTPATIENT)
Age: 47
End: 2022-06-02

## 2022-06-26 ENCOUNTER — RX RENEWAL (OUTPATIENT)
Age: 47
End: 2022-06-26

## 2022-08-08 NOTE — ED PROVIDER NOTE - IV ALTEPLASE INCLUSION HIDDEN
PAST MEDICAL HISTORY:  2019 novel coronavirus disease (COVID-19) 12/24/2021  no hospitalization, no treatment    H/O acute cholangitis      show

## 2022-08-22 ENCOUNTER — RX RENEWAL (OUTPATIENT)
Age: 47
End: 2022-08-22

## 2022-09-14 ENCOUNTER — APPOINTMENT (OUTPATIENT)
Dept: INTERNAL MEDICINE | Facility: CLINIC | Age: 47
End: 2022-09-14

## 2022-09-14 VITALS
DIASTOLIC BLOOD PRESSURE: 81 MMHG | SYSTOLIC BLOOD PRESSURE: 145 MMHG | BODY MASS INDEX: 31.91 KG/M2 | OXYGEN SATURATION: 96 % | RESPIRATION RATE: 18 BRPM | WEIGHT: 169 LBS | TEMPERATURE: 98.7 F | HEART RATE: 81 BPM | HEIGHT: 61 IN

## 2022-09-14 VITALS — DIASTOLIC BLOOD PRESSURE: 88 MMHG | SYSTOLIC BLOOD PRESSURE: 155 MMHG

## 2022-09-14 DIAGNOSIS — Z11.3 ENCOUNTER FOR SCREENING FOR INFECTIONS WITH A PREDOMINANTLY SEXUAL MODE OF TRANSMISSION: ICD-10-CM

## 2022-09-14 DIAGNOSIS — K21.9 GASTRO-ESOPHAGEAL REFLUX DISEASE W/OUT ESOPHAGITIS: ICD-10-CM

## 2022-09-14 DIAGNOSIS — S99.921A UNSPECIFIED INJURY OF RIGHT FOOT, INITIAL ENCOUNTER: ICD-10-CM

## 2022-09-14 DIAGNOSIS — Z23 ENCOUNTER FOR IMMUNIZATION: ICD-10-CM

## 2022-09-14 DIAGNOSIS — Z00.00 ENCOUNTER FOR GENERAL ADULT MEDICAL EXAMINATION W/OUT ABNORMAL FINDINGS: ICD-10-CM

## 2022-09-14 DIAGNOSIS — R51.9 HEADACHE, UNSPECIFIED: ICD-10-CM

## 2022-09-14 PROCEDURE — 99396 PREV VISIT EST AGE 40-64: CPT | Mod: 25

## 2022-09-14 PROCEDURE — 99214 OFFICE O/P EST MOD 30 MIN: CPT | Mod: 25

## 2022-09-14 PROCEDURE — 83014 H PYLORI DRUG ADMIN: CPT

## 2022-09-14 PROCEDURE — 36415 COLL VENOUS BLD VENIPUNCTURE: CPT

## 2022-09-14 RX ORDER — UBIDECARENONE 30 MG
81 CAPSULE ORAL
Qty: 30 | Refills: 6 | Status: ACTIVE | COMMUNITY
Start: 2021-08-05 | End: 1900-01-01

## 2022-09-14 NOTE — HEALTH RISK ASSESSMENT
[0] : 2) Feeling down, depressed, or hopeless: Not at all (0) [PHQ-2 Negative - No further assessment needed] : PHQ-2 Negative - No further assessment needed [Patient reported mammogram was normal] : Patient reported mammogram was normal [Patient reported PAP Smear was normal] : Patient reported PAP Smear was normal [Patient reported colonoscopy was normal] : Patient reported colonoscopy was normal [HIV Test offered] : HIV Test offered [Hepatitis C test offered] : Hepatitis C test offered [[id number]] : 0 [MammogramDate] : 2020 [PapSmearDate] : 2020 [ColonoscopyDate] : 6/2021 [ColonoscopyComments] :  return 5 years

## 2022-09-14 NOTE — END OF VISIT
[FreeTextEntry3] : I personally performed the service described in the documentation, reviewed the documentation recorded by the PA in my presence and it accurately and completely records my words and actions.

## 2022-09-14 NOTE — PHYSICAL EXAM
[No CVA Tenderness] : no CVA  tenderness [No Focal Deficits] : no focal deficits [Normal] : affect was normal and insight and judgment were intact [de-identified] : mild tenderness over LUQ with deep palpation, negative murphys sign

## 2022-09-14 NOTE — HISTORY OF PRESENT ILLNESS
[FreeTextEntry1] : annual physical [de-identified] : Pt is a 48 y/o F with PMHx of hypothyroidism, DM, HTN, HLD, GERD,  who presents to the office today for annual physical\par \par Migraine HAs:\par - Pt getting migraine HAs every day. Lasts 3 hours up until 2 days for past 2 months.  Used to get them once a month\par - Severity of HA can be 10/10.  Described as throbbing over L temple. \par - Phonophobia, nausea, no vomiting, aura, pt states HA wakes her up from sleep\par - tried Tylenol- does not help at all, sits in quiet room rest.  Used to take Excedrin in the past but has not tried it since Covid\par \par Menorrhagia:\par - Pt states her menses can last up to 3 weeks\par - she has IUD placed- Last saw  gyn 2020\par - denies dizziness fatigue\par \par Epigastric pain:\par - occurs 2 - 3 times a week.  last 1-2 hours last past few months\par - no N/V, fever, chills, change in appetite, association with food\par - does not drink or smoke\par \par Not taking rosuvastatin and lisinopril\par \par HCM\par - Covid vaccine: Needs booster- recommend bivalent\par - Influenza vaccine: needs- declines\par - Colonoscopy: 6/2021- return 5 years\par - Mammo: needs referral\par - Pap: 2020- normal\par - Ophthalmology: > 1 year ago\par - Diet: eating more vegetables now, but has gained weight\par - Sexual activity: wants testing

## 2022-09-15 RX ORDER — ROSUVASTATIN CALCIUM 40 MG/1
40 TABLET, FILM COATED ORAL
Qty: 90 | Refills: 3 | Status: COMPLETED | COMMUNITY
Start: 2020-08-12 | End: 2022-09-15

## 2022-09-16 LAB
25(OH)D3 SERPL-MCNC: 22.4 NG/ML
ALBUMIN SERPL ELPH-MCNC: 4.6 G/DL
ALP BLD-CCNC: 73 U/L
ALT SERPL-CCNC: 34 U/L
ANION GAP SERPL CALC-SCNC: 12 MMOL/L
APPEARANCE: CLEAR
AST SERPL-CCNC: 19 U/L
BASOPHILS # BLD AUTO: 0.03 K/UL
BASOPHILS NFR BLD AUTO: 0.5 %
BILIRUB SERPL-MCNC: 0.3 MG/DL
BILIRUBIN URINE: NEGATIVE
BLOOD URINE: NEGATIVE
BUN SERPL-MCNC: 7 MG/DL
C TRACH RRNA SPEC QL NAA+PROBE: NOT DETECTED
CALCIUM SERPL-MCNC: 9 MG/DL
CHLORIDE SERPL-SCNC: 100 MMOL/L
CHOLEST SERPL-MCNC: 273 MG/DL
CO2 SERPL-SCNC: 24 MMOL/L
COLOR: NORMAL
CREAT SERPL-MCNC: 0.37 MG/DL
EGFR: 125 ML/MIN/1.73M2
EOSINOPHIL # BLD AUTO: 0.04 K/UL
EOSINOPHIL NFR BLD AUTO: 0.7 %
ESTIMATED AVERAGE GLUCOSE: 260 MG/DL
GLUCOSE QUALITATIVE U: ABNORMAL
GLUCOSE SERPL-MCNC: 248 MG/DL
HBA1C MFR BLD HPLC: 10.7 %
HCT VFR BLD CALC: 43.2 %
HCV AB SER QL: NONREACTIVE
HCV S/CO RATIO: 0.25 S/CO
HDLC SERPL-MCNC: 48 MG/DL
HGB BLD-MCNC: 13.7 G/DL
HIV1+2 AB SPEC QL IA.RAPID: NONREACTIVE
IMM GRANULOCYTES NFR BLD AUTO: 0.3 %
KETONES URINE: ABNORMAL
LDLC SERPL CALC-MCNC: 209 MG/DL
LEUKOCYTE ESTERASE URINE: NEGATIVE
LPL SERPL-CCNC: 29 U/L
LYMPHOCYTES # BLD AUTO: 1.44 K/UL
LYMPHOCYTES NFR BLD AUTO: 23.7 %
MAN DIFF?: NORMAL
MCHC RBC-ENTMCNC: 28.2 PG
MCHC RBC-ENTMCNC: 31.7 GM/DL
MCV RBC AUTO: 89.1 FL
MONOCYTES # BLD AUTO: 0.34 K/UL
MONOCYTES NFR BLD AUTO: 5.6 %
N GONORRHOEA RRNA SPEC QL NAA+PROBE: NOT DETECTED
NEUTROPHILS # BLD AUTO: 4.21 K/UL
NEUTROPHILS NFR BLD AUTO: 69.2 %
NITRITE URINE: NEGATIVE
NONHDLC SERPL-MCNC: 225 MG/DL
PH URINE: 6
PLATELET # BLD AUTO: 229 K/UL
POTASSIUM SERPL-SCNC: 4.3 MMOL/L
PROT SERPL-MCNC: 7.6 G/DL
PROTEIN URINE: NEGATIVE
RBC # BLD: 4.85 M/UL
RBC # FLD: 14.1 %
SODIUM SERPL-SCNC: 137 MMOL/L
SOURCE AMPLIFICATION: NORMAL
SPECIFIC GRAVITY URINE: 1.01
T PALLIDUM AB SER QL IA: NEGATIVE
TRIGL SERPL-MCNC: 81 MG/DL
TSH SERPL-ACNC: 0.42 UIU/ML
UREA BREATH TEST QL: NEGATIVE
UROBILINOGEN URINE: NORMAL
WBC # FLD AUTO: 6.08 K/UL

## 2022-09-20 LAB
AMYLASE P SERPL-CCNC: 16 U/L
AMYLASE S SERPL-CCNC: 10 U/L
AMYLASE SERPL-CCNC: 26 U/L

## 2022-10-10 ENCOUNTER — NON-APPOINTMENT (OUTPATIENT)
Age: 47
End: 2022-10-10

## 2022-10-10 ENCOUNTER — APPOINTMENT (OUTPATIENT)
Dept: OBGYN | Facility: CLINIC | Age: 47
End: 2022-10-10

## 2022-10-10 ENCOUNTER — LABORATORY RESULT (OUTPATIENT)
Age: 47
End: 2022-10-10

## 2022-10-10 VITALS
DIASTOLIC BLOOD PRESSURE: 88 MMHG | OXYGEN SATURATION: 97 % | SYSTOLIC BLOOD PRESSURE: 153 MMHG | BODY MASS INDEX: 31.74 KG/M2 | HEART RATE: 83 BPM | WEIGHT: 168 LBS

## 2022-10-10 DIAGNOSIS — N89.8 OTHER SPECIFIED NONINFLAMMATORY DISORDERS OF VAGINA: ICD-10-CM

## 2022-10-10 DIAGNOSIS — N83.209 UNSPECIFIED OVARIAN CYST, UNSPECIFIED SIDE: ICD-10-CM

## 2022-10-10 DIAGNOSIS — Z01.419 ENCOUNTER FOR GYNECOLOGICAL EXAMINATION (GENERAL) (ROUTINE) W/OUT ABNORMAL FINDINGS: ICD-10-CM

## 2022-10-10 PROCEDURE — 99386 PREV VISIT NEW AGE 40-64: CPT

## 2022-10-11 ENCOUNTER — RX RENEWAL (OUTPATIENT)
Age: 47
End: 2022-10-11

## 2022-10-13 LAB
CANDIDA VAG CYTO: NOT DETECTED
G VAGINALIS+PREV SP MTYP VAG QL MICRO: NOT DETECTED
T VAGINALIS VAG QL WET PREP: NOT DETECTED

## 2022-10-13 NOTE — HISTORY OF PRESENT ILLNESS
[Patient reported PAP Smear was normal] : Patient reported PAP Smear was normal [Normal Amount/Duration] :  normal amount and duration [Menstrual Cramps] : menstrual cramps [Currently Active] : currently active [Men] : men [No] : No [Yes] : Yes [PapSmeardate] : 2020 [FreeTextEntry1] : 10/02/2022 [FreeTextEntry3] : IUD

## 2022-10-13 NOTE — COUNSELING
[Nutrition/ Exercise/ Weight Management] : nutrition, exercise, weight management [Body Image] : body image [Influenza Vaccine] : influenza vaccine [Other ___] : [unfilled]

## 2022-10-13 NOTE — PHYSICAL EXAM
[Chaperone Present] : A chaperone was present in the examining room during all aspects of the physical examination [Appropriately responsive] : appropriately responsive [Alert] : alert [No Acute Distress] : no acute distress [Soft] : soft [Non-tender] : non-tender [Non-distended] : non-distended [No Lesions] : no lesions [No Mass] : no mass [Oriented x3] : oriented x3 [Examination Of The Breasts] : a normal appearance [No Masses] : no breast masses were palpable [Labia Majora] : normal [Labia Minora] : normal [Discharge] : a  ~M vaginal discharge was present [Moderate] : moderate [Clear] : clear [Thin] : thin [No Bleeding] : There was no active vaginal bleeding [Normal] : normal [Uterine Adnexae] : non-palpable [FreeTextEntry3] : n [FreeTextEntry8] : Tender to deep palpation in the right lower quadrant; no CMT, no adnexal masses or fullness appreciated.

## 2022-10-13 NOTE — PLAN
[FreeTextEntry1] : Ms. Pablo presents for annual exam. Patient is clinically doing well but with vaginal dryness. \par - Vitals reviewed- BP elevated; patient to follow up with her primary \par - Breast exam, pelvic exam and pap smear performed today. \par - Patient has referral for screening mammogram \par - Will send patient for pelvic sonogram given exam findings and patient's complaint of pain. \par - Affirm swab sent \par - Pending US results and mammogram, will likely start vaginal estrogen for vaginal dryness.  \par  \par Return to the office pending results, as needed for GYN concerns and in 1 year for annual follow up. Plan of care discussed with patient who has no additional questions and is in agreement.\par

## 2022-10-13 NOTE — PROCEDURE
[Cervical Pap Smear] : cervical Pap smear [Liquid Base] : liquid base [Affirm (Triple Culture)] : Affirm (triple culture) [Tolerated Well] : the patient tolerated the procedure well [de-identified] : IUD string not visualized

## 2022-10-24 RX ORDER — UBROGEPANT 100 MG/1
100 TABLET ORAL
Refills: 0 | Status: ACTIVE | COMMUNITY

## 2022-10-24 RX ORDER — OMEGA-3/DHA/EPA/FISH OIL 300-1000MG
CAPSULE ORAL
Refills: 0 | Status: ACTIVE | COMMUNITY

## 2022-10-25 LAB — CYTOLOGY CVX/VAG DOC THIN PREP: ABNORMAL

## 2022-12-12 ENCOUNTER — APPOINTMENT (OUTPATIENT)
Dept: HEART AND VASCULAR | Facility: CLINIC | Age: 47
End: 2022-12-12

## 2022-12-14 ENCOUNTER — APPOINTMENT (OUTPATIENT)
Dept: INTERNAL MEDICINE | Facility: CLINIC | Age: 47
End: 2022-12-14

## 2022-12-21 ENCOUNTER — NON-APPOINTMENT (OUTPATIENT)
Age: 47
End: 2022-12-21

## 2022-12-21 ENCOUNTER — APPOINTMENT (OUTPATIENT)
Dept: ENDOCRINOLOGY | Facility: CLINIC | Age: 47
End: 2022-12-21

## 2022-12-21 VITALS
WEIGHT: 169 LBS | BODY MASS INDEX: 31.93 KG/M2 | DIASTOLIC BLOOD PRESSURE: 86 MMHG | HEART RATE: 88 BPM | SYSTOLIC BLOOD PRESSURE: 152 MMHG

## 2022-12-21 DIAGNOSIS — D49.7 NEOPLASM OF UNSPECIFIED BEHAVIOR OF ENDOCRINE GLANDS AND OTHER PARTS OF NERVOUS SYSTEM: ICD-10-CM

## 2022-12-21 LAB
GLUCOSE BLDC GLUCOMTR-MCNC: 215
HBA1C MFR BLD HPLC: 8.9

## 2022-12-21 PROCEDURE — 82962 GLUCOSE BLOOD TEST: CPT | Mod: NC

## 2022-12-21 PROCEDURE — 36415 COLL VENOUS BLD VENIPUNCTURE: CPT

## 2022-12-21 PROCEDURE — 83036 HEMOGLOBIN GLYCOSYLATED A1C: CPT | Mod: QW

## 2022-12-21 PROCEDURE — 99214 OFFICE O/P EST MOD 30 MIN: CPT | Mod: 25

## 2022-12-21 RX ORDER — LANCETS 33 GAUGE
EACH MISCELLANEOUS
Qty: 1 | Refills: 3 | Status: ACTIVE | COMMUNITY
Start: 2020-10-12 | End: 1900-01-01

## 2022-12-21 RX ORDER — BLOOD-GLUCOSE METER
70 EACH MISCELLANEOUS
Qty: 90 | Refills: 3 | Status: ACTIVE | COMMUNITY
Start: 2020-11-06 | End: 1900-01-01

## 2022-12-21 RX ORDER — BLOOD SUGAR DIAGNOSTIC
STRIP MISCELLANEOUS
Qty: 90 | Refills: 3 | Status: ACTIVE | COMMUNITY
Start: 2020-10-12 | End: 1900-01-01

## 2022-12-22 LAB
CREAT SPEC-SCNC: 115 MG/DL
MICROALBUMIN 24H UR DL<=1MG/L-MCNC: <1.2 MG/DL
MICROALBUMIN/CREAT 24H UR-RTO: NORMAL MG/G

## 2022-12-22 NOTE — ASSESSMENT
[FreeTextEntry1] : Type 2 diabetes mellitus/elevated body mass index. Point-of-care HbA1c 8.9% and glucose 215 mg/dL today; HbA1c 10.7% in September 2022. No known history of micro- or macrovascular complications. I congratulated her on her improving glycemic control. We discussed the cardiovascular and microvascular complications of uncontrolled diabetes. We discussed the importance of diet and exercise and lifestyle modification for glycemic control and weight loss. We discussed follow-up with nutrition. We discussed pharmacologic options for glycemic control and weight loss. We discussed the risks and benefits of the GLP-1 receptor agonist class, including but not limited to nausea, pancreatitis, medullary thyroid cancer. We reviewed subcutaneous injection technique, storage, and sharps disposal. We discussed the risks and benefits of the SGLT-2 inhibitor class, including but not limited to urinary tract infection, fungal infection, Linette's gangrene. She will consider her options. \par Continue metformin 500 mg twice daily \par Check blood sugars daily, fasting or postprandial\par She is not on a blood pressure regimen; blood pressure elevated today and recommended appointment with cardiology\par She is on a statin for cholesterol; last lipid panel not at goal and recommended appointment with cardiology\par Nephropathy screening: Due \par Last ophthalmology appointment: February 2022; annual\par Last dental appointment: Within six months\par She is up-to-date with COVID-19 (Pfizer) vaccine up to the second booster; she declined influenza vaccination today but may consider next visit\par \par Non-invasive follicular thyroid 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 Postoperative hypothyroidism. She is status post total thyroidectomy in July 2020 for a Thyroseq positive left thyroid nodule. 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 No further evaluation is needed since her diagnosis is not thyroid cancer, however, we can consider monitoring as needed. She has been biochemically euthyroid on her current regimen. We have reviewed proper use and compliance with levothyroxine. \par Continue levothyroxine 100 mcg daily\par \par Vitamin D deficiency. Continue ergocalciferol 50,000 intl units weekly. \par \par Return to see Riddhi Sanders NP in 3-4 weeks. Patient advised to call earlier with significant hypo- or hyperglycemia.

## 2022-12-22 NOTE — PHYSICAL EXAM
[Alert] : alert [Healthy Appearance] : healthy appearance [No Acute Distress] : no acute distress [Normal Sclera/Conjunctiva] : normal sclera/conjunctiva [Normal Hearing] : hearing was normal [Well Healed Scar] : well healed scar [No Respiratory Distress] : no respiratory distress [No Stigmata of Cushings Syndrome] : no stigmata of Cushings Syndrome [Normal Gait] : normal gait [Acanthosis Nigricans] : acanthosis nigricans present [Normal Insight/Judgement] : insight and judgment were intact [Right foot was examined, including] : right foot ~C was examined, including visual inspection with sensory and pulse exams [Left foot was examined, including] : left foot ~C was examined, including visual inspection with sensory and pulse exams [Normal] : normal [2+] : 2+ in the dorsalis pedis [Kyphosis] : no kyphosis present [Diminished Throughout Both Feet] : normal tactile sensation with monofilament testing throughout both feet [de-identified] : no moon facies, no supraclavicular fat pads

## 2022-12-22 NOTE — HISTORY OF PRESENT ILLNESS
[FreeTextEntry1] : Ms. 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 I saw her for an initial visit in August 2020 and last in July 2021.\par \par Type 2 diabetes mellitus. Point-of-care HbA1c 8.9% and glucose 215 mg/dL today; HbA1c 10.7% in September 2022, 7.7% in June 2021, 6.7% in January 2021, 7.3% in October 2020, 8.3% in July 2020. No known history of micro- or macrovascular complications. Her mother and father have a history of diabetes.\par She was diagnosed with diabetes in July 2020 by hemoglobin A1c. She had a history of gestational diabetes with her three pregnancies. 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 Postoperative hypothyroidism.\par She is status post total thyroidectomy in July 2020 for a Thyroseq positive left thyroid nodule. 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 She has been taking levothyroxine 100 mcg daily.\par She is taking levothyroxine in the morning, on an empty stomach, with plain water, and waiting at least 30 minutes before eating. She is not taking calcium/iron/multivitamin. \par No history of radiation exposure.\par No known family history of thyroid cancer.\par \par Interim History \par She has been taking metformin 500 mg twice daily since September; she had some initial gastrointestinal upset but has recently tolerated well.\par She had a foot injury at work last year. She has had limited physical activity. She will be returning to work soon. \par She has seen multiple providers; notes reviewed. Last laboratory results reviewed. HbA1c 10.7%. Lipid panel not at goal. \par She has had polyuria and polydipsia. No chest pain, shortness of breath, lower extremity numbness/tingling. She has a Mirena intrauterine device in place with regular menstrual bleeding.\par Medical and surgical history, medications, allergies, social and family history reviewed and updated as needed.

## 2023-02-20 NOTE — PATIENT PROFILE OB - NS PRO TDAP RECEIVED Y/N
Hydroxychloroquine Pregnancy And Lactation Text: This medication has been shown to cause fetal harm but it isn't assigned a Pregnancy Risk Category. There are small amounts excreted in breast milk. yes...

## 2023-02-21 ENCOUNTER — APPOINTMENT (OUTPATIENT)
Dept: ENDOCRINOLOGY | Facility: CLINIC | Age: 48
End: 2023-02-21
Payer: COMMERCIAL

## 2023-02-21 VITALS
DIASTOLIC BLOOD PRESSURE: 83 MMHG | HEART RATE: 7 BPM | BODY MASS INDEX: 31.55 KG/M2 | SYSTOLIC BLOOD PRESSURE: 152 MMHG | WEIGHT: 167 LBS

## 2023-02-21 LAB
GLUCOSE BLDC GLUCOMTR-MCNC: 157
HBA1C MFR BLD HPLC: 9.5

## 2023-02-21 PROCEDURE — 82962 GLUCOSE BLOOD TEST: CPT | Mod: NC

## 2023-02-21 PROCEDURE — 99214 OFFICE O/P EST MOD 30 MIN: CPT | Mod: 25

## 2023-02-21 PROCEDURE — 97802 MEDICAL NUTRITION INDIV IN: CPT

## 2023-02-21 PROCEDURE — 83036 HEMOGLOBIN GLYCOSYLATED A1C: CPT | Mod: QW

## 2023-02-21 RX ORDER — BLOOD-GLUCOSE METER
W/DEVICE KIT MISCELLANEOUS
Qty: 1 | Refills: 0 | Status: ACTIVE | COMMUNITY
Start: 2023-02-21 | End: 1900-01-01

## 2023-02-21 RX ORDER — BLOOD SUGAR DIAGNOSTIC
STRIP MISCELLANEOUS
Qty: 100 | Refills: 3 | Status: ACTIVE | COMMUNITY
Start: 2023-02-21 | End: 1900-01-01

## 2023-02-21 RX ORDER — LANCETS 28 GAUGE
EACH MISCELLANEOUS
Qty: 200 | Refills: 3 | Status: ACTIVE | COMMUNITY
Start: 2023-02-21 | End: 1900-01-01

## 2023-02-21 NOTE — PHYSICAL EXAM
[Alert] : alert [No Acute Distress] : no acute distress [Normal Voice/Communication] : normal voice communication [No Respiratory Distress] : no respiratory distress [Normal Rate and Effort] : normal respiratory rate and effort [Normal Gait] : normal gait [Oriented x3] : oriented to person, place, and time [Normal Affect] : the affect was normal [Normal Insight/Judgement] : insight and judgment were intact [Normal Mood] : the mood was normal

## 2023-02-21 NOTE — ASSESSMENT
[FreeTextEntry1] : 1. T2D\par A1C goal <7%\par A1C - 9.5% (2/21/23) from 8.9% (12/21/22) from 10.7 (9/2022) from 7.7% (6/2021)\par Current regimen: Metformin 500mg BID\par Has had GI issues on even current dose, which have resolved, does not want to titrate dosing of metformin\par Glucometer readings at home reveal BG at or close to goal, not consistent with rising A1C today.  With further review, glucometer is many years old\par Glucometer reading performed in office today is 157 4 hours postprandial\par Counseling provided re: GLP1 agonist regimen, inclusive of dosing, injection administration, mechanism if action (increases glucose-dependent insulin secretion, decreases inappropriate glucagon secretion, slows gastric emptying, and decreases food intake), benefits (cardiovascular, renal, weight loss promotion, A1C reduction), possible side effects (commonly GI: nausea/vomiting, diarrhea, constipation; headache, fatigue dizziness, injection site reactions and rare: pancreatitis).  Discussed contraindication if personal/fhx of MEN or medullary thyroid cancer and rationale of this contraindication\par Denies fhx/personal h/o MEN or medullary thyroid cancer.\par -- RX for new glucometer + supplies\par -- Trulicity sample provided with injection education provided as well, open to trial to get sugars under better control, requesting medication information (provided via UpToDate)\par \par 2. Postoperative hypothyroidism\par Current regimen: Levothyroxine 100mcg QAM (since 10/2020)\par S/p total thyroidectomy in July 2020 for a Thyroseq positive left thyroid nodule. 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

## 2023-02-21 NOTE — HISTORY OF PRESENT ILLNESS
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 Fung, last visit, 12/21/2022\par \par Interval change:\par Continues metformin twice daily with resolution of digestive issue\par Testing BG with meter > 2++ years old, readings as below, which are not consistent with increasing A1C\par Seeing nutritionist today as well\par Concerned for diabetes health, as has lost family members/family members with _diabetes related complications\par \par A1C - 9.5% (2/21/23) from 8.9% (12/21/22) from 10.7 (9/2022) from 7.7% (6/2021)\par Office Fingerstick -- 157 (4H postprandial)\par \par Current medication:\par - Metformin 500mg BID\par \par Past medication:\par - None\par \par GILDA reports they take their diabetes medication all of the time.\par GILDA denies hypoglycemia symptoms or BG <70\par \par Diabetes Self-Management:\par Monitoring BG every other day\par --BG range random: 126-140, lower is fasting.  some 160s later in the day\par \par Diet--\par Typically consumes 2 meals/daily, +/- snacks\par Beverages: no soda, increasing water, coffee with no sugar, just cream\par \par Ophthalmology: 2/2022, will FU\par \par \par 2. Postoperative hypothyroidism\par Current regimen: Levothyroxine 100mcg QAM (since 10/2020)\par S/p total thyroidectomy in July 2020 for a Thyroseq positive left thyroid nodule. 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

## 2023-03-28 ENCOUNTER — APPOINTMENT (OUTPATIENT)
Dept: ENDOCRINOLOGY | Facility: CLINIC | Age: 48
End: 2023-03-28

## 2023-06-26 ENCOUNTER — RX RENEWAL (OUTPATIENT)
Age: 48
End: 2023-06-26

## 2023-11-27 ENCOUNTER — RX RENEWAL (OUTPATIENT)
Age: 48
End: 2023-11-27

## 2024-02-20 ENCOUNTER — RX RENEWAL (OUTPATIENT)
Age: 49
End: 2024-02-20

## 2024-02-20 RX ORDER — ERGOCALCIFEROL 1.25 MG/1
1.25 MG CAPSULE, LIQUID FILLED ORAL
Qty: 12 | Refills: 0 | Status: ACTIVE | COMMUNITY
Start: 2021-07-05 | End: 1900-01-01

## 2024-02-21 ENCOUNTER — RX RENEWAL (OUTPATIENT)
Age: 49
End: 2024-02-21

## 2024-02-21 RX ORDER — LEVOTHYROXINE SODIUM 0.1 MG/1
100 TABLET ORAL
Qty: 90 | Refills: 0 | Status: ACTIVE | COMMUNITY
Start: 2021-04-12 | End: 1900-01-01

## 2024-04-25 ENCOUNTER — APPOINTMENT (OUTPATIENT)
Dept: OBGYN | Facility: CLINIC | Age: 49
End: 2024-04-25
Payer: COMMERCIAL

## 2024-04-25 VITALS
OXYGEN SATURATION: 96 % | SYSTOLIC BLOOD PRESSURE: 148 MMHG | DIASTOLIC BLOOD PRESSURE: 91 MMHG | BODY MASS INDEX: 31.18 KG/M2 | HEART RATE: 85 BPM | WEIGHT: 165 LBS

## 2024-04-25 DIAGNOSIS — Z12.4 ENCOUNTER FOR SCREENING FOR MALIGNANT NEOPLASM OF CERVIX: ICD-10-CM

## 2024-04-25 DIAGNOSIS — Z12.39 ENCOUNTER FOR OTHER SCREENING FOR MALIGNANT NEOPLASM OF BREAST: ICD-10-CM

## 2024-04-25 DIAGNOSIS — N76.0 ACUTE VAGINITIS: ICD-10-CM

## 2024-04-25 DIAGNOSIS — N89.8 OTHER SPECIFIED NONINFLAMMATORY DISORDERS OF VAGINA: ICD-10-CM

## 2024-04-25 DIAGNOSIS — R10.2 PELVIC AND PERINEAL PAIN: ICD-10-CM

## 2024-04-25 PROCEDURE — 99396 PREV VISIT EST AGE 40-64: CPT

## 2024-04-25 PROCEDURE — 99213 OFFICE O/P EST LOW 20 MIN: CPT | Mod: 25

## 2024-04-25 PROCEDURE — 99459 PELVIC EXAMINATION: CPT

## 2024-04-25 RX ORDER — FLUCONAZOLE 150 MG/1
150 TABLET ORAL
Qty: 1 | Refills: 0 | Status: ACTIVE | COMMUNITY
Start: 2024-04-25 | End: 1900-01-01

## 2024-04-25 RX ORDER — ESTRADIOL 0.1 MG/G
0.1 CREAM VAGINAL
Qty: 1 | Refills: 3 | Status: ACTIVE | COMMUNITY
Start: 2024-04-25 | End: 1900-01-01

## 2024-04-25 NOTE — PHYSICAL EXAM
[Chaperone Present] : A chaperone was present in the examining room during all aspects of the physical examination [[id number]] : A chaperone was present during the pelvic exam. [Appropriately responsive] : appropriately responsive [Alert] : alert [No Acute Distress] : no acute distress [No Lymphadenopathy] : no lymphadenopathy [Soft] : soft [Non-tender] : non-tender [Non-distended] : non-distended [No Lesions] : no lesions [No Mass] : no mass [Examination Of The Breasts] : a normal appearance [Tenderness Of The Left Breast] : tenderness [No Masses] : no breast masses were palpable [Labia Majora] : normal [Labia Minora] : normal [Discharge] : a  ~M vaginal discharge was present [Moderate] : moderate [White] : white [Thick] : thick [No Bleeding] : There was no active vaginal bleeding [IUD String] : an IUD string was noted [Normal] : normal [Uterine Adnexae] : non-palpable [FreeTextEntry8] : suprapubic tenderness; no CMT, no adnexal masses or fullness appreciated.

## 2024-04-25 NOTE — HISTORY OF PRESENT ILLNESS
[Patient reported PAP Smear was normal] : Patient reported PAP Smear was normal [Normal Amount/Duration] :  normal amount and duration [Frequency: Q ___ days] : menstrual periods occur approximately every [unfilled] days [Menstrual Cramps] : menstrual cramps [PapSmeardate] : 10/10/2022 [FreeTextEntry1] : 03/20/2024

## 2024-04-25 NOTE — PLAN
[FreeTextEntry1] : Ms. Pablo presents for well woman's  exam with complaints of vaginal itching and dryness.   - Vitals reviewed and within normal limits.  - Breast exam, pelvic exam and pap smear performed today.  - Referral for breast US, screening mammogram and pelvic sonogram provided.  - Vaginitis panel sent for acute symptoms. Will also treat with Diflucan given findings on exam.  - Patient counseled on utility of trial of vaginal estrogen. Risks and benefits discussed. Patient has no contraindications and is interested in a trial of vaginal estrogen therapy. Precautions given. Prescription sent to patient's pharmacy - Patient preventative health actions reviewed-  encouraged well balanced diet and weight bearing exercise.    Return to the office pending results, as needed for GYN concerns and in 1 year for annual follow up. Plan of care discussed with patient who has no additional questions and is in agreement.

## 2024-04-25 NOTE — PROCEDURE
[Cervical Pap Smear] : cervical Pap smear [Liquid Base] : liquid base [Tolerated Well] : the patient tolerated the procedure well [No Complications] : there were no complications [de-identified] : Vaginitis plus

## 2024-04-29 LAB
A VAGINAE DNA VAG QL NAA+PROBE: NORMAL
BVAB2 DNA VAG QL NAA+PROBE: NORMAL
C KRUSEI DNA VAG QL NAA+PROBE: NEGATIVE
C TRACH RRNA SPEC QL NAA+PROBE: NEGATIVE
CANDIDA DNA VAG QL NAA+PROBE: NEGATIVE
CYTOLOGY CVX/VAG DOC THIN PREP: NORMAL
MEGA1 DNA VAG QL NAA+PROBE: NORMAL
N GONORRHOEA RRNA SPEC QL NAA+PROBE: NEGATIVE
T VAGINALIS RRNA SPEC QL NAA+PROBE: NEGATIVE

## 2024-05-01 RX ORDER — GLYBURIDE 5 MG
2.5 TABLET ORAL
Qty: 0 | Refills: 0 | DISCHARGE

## 2024-05-11 PROBLEM — O15.9 ECLAMPSIA, UNSPECIFIED AS TO TIME PERIOD: Chronic | Status: ACTIVE | Noted: 2020-07-16

## 2024-05-11 PROBLEM — E11.9 TYPE 2 DIABETES MELLITUS WITHOUT COMPLICATIONS: Chronic | Status: ACTIVE | Noted: 2021-10-19

## 2024-05-11 PROBLEM — N20.0 CALCULUS OF KIDNEY: Chronic | Status: ACTIVE | Noted: 2020-07-16

## 2024-05-11 PROBLEM — R13.10 DYSPHAGIA, UNSPECIFIED: Chronic | Status: ACTIVE | Noted: 2020-07-16

## 2024-05-11 PROBLEM — D44.9 NEOPLASM OF UNCERTAIN BEHAVIOR OF UNSPECIFIED ENDOCRINE GLAND: Chronic | Status: ACTIVE | Noted: 2020-07-16

## 2024-05-14 ENCOUNTER — RESULT REVIEW (OUTPATIENT)
Age: 49
End: 2024-05-14

## 2024-05-14 ENCOUNTER — APPOINTMENT (OUTPATIENT)
Dept: INTERNAL MEDICINE | Facility: CLINIC | Age: 49
End: 2024-05-14
Payer: COMMERCIAL

## 2024-05-14 VITALS
HEART RATE: 86 BPM | OXYGEN SATURATION: 95 % | SYSTOLIC BLOOD PRESSURE: 151 MMHG | BODY MASS INDEX: 30.63 KG/M2 | TEMPERATURE: 98.5 F | DIASTOLIC BLOOD PRESSURE: 87 MMHG | WEIGHT: 162.25 LBS | HEIGHT: 61 IN

## 2024-05-14 DIAGNOSIS — E55.9 VITAMIN D DEFICIENCY, UNSPECIFIED: ICD-10-CM

## 2024-05-14 DIAGNOSIS — R10.13 EPIGASTRIC PAIN: ICD-10-CM

## 2024-05-14 DIAGNOSIS — E11.9 TYPE 2 DIABETES MELLITUS W/OUT COMPLICATIONS: ICD-10-CM

## 2024-05-14 DIAGNOSIS — D22.9 MELANOCYTIC NEVI, UNSPECIFIED: ICD-10-CM

## 2024-05-14 DIAGNOSIS — Z80.3 FAMILY HISTORY OF MALIGNANT NEOPLASM OF BREAST: ICD-10-CM

## 2024-05-14 DIAGNOSIS — M79.629 PAIN IN UNSPECIFIED UPPER ARM: ICD-10-CM

## 2024-05-14 DIAGNOSIS — E78.5 TYPE 2 DIABETES MELLITUS WITH OTHER SPECIFIED COMPLICATION: ICD-10-CM

## 2024-05-14 DIAGNOSIS — N64.4 MASTODYNIA: ICD-10-CM

## 2024-05-14 DIAGNOSIS — R10.33 PERIUMBILICAL PAIN: ICD-10-CM

## 2024-05-14 DIAGNOSIS — E89.0 POSTPROCEDURAL HYPOTHYROIDISM: ICD-10-CM

## 2024-05-14 DIAGNOSIS — I10 ESSENTIAL (PRIMARY) HYPERTENSION: ICD-10-CM

## 2024-05-14 DIAGNOSIS — E11.69 TYPE 2 DIABETES MELLITUS WITH OTHER SPECIFIED COMPLICATION: ICD-10-CM

## 2024-05-14 DIAGNOSIS — R21 RASH AND OTHER NONSPECIFIC SKIN ERUPTION: ICD-10-CM

## 2024-05-14 DIAGNOSIS — E66.9 OBESITY, UNSPECIFIED: ICD-10-CM

## 2024-05-14 PROCEDURE — 99214 OFFICE O/P EST MOD 30 MIN: CPT

## 2024-05-14 PROCEDURE — G2211 COMPLEX E/M VISIT ADD ON: CPT

## 2024-05-14 RX ORDER — MOMETASONE FUROATE 1 MG/G
0.1 CREAM TOPICAL DAILY
Qty: 1 | Refills: 1 | Status: ACTIVE | COMMUNITY
Start: 2024-05-14 | End: 1900-01-01

## 2024-05-14 RX ORDER — LISINOPRIL 10 MG/1
10 TABLET ORAL
Qty: 90 | Refills: 3 | Status: ACTIVE | COMMUNITY
Start: 2024-05-14 | End: 1900-01-01

## 2024-05-14 RX ORDER — METFORMIN HYDROCHLORIDE 500 MG/1
500 TABLET, COATED ORAL TWICE DAILY
Qty: 180 | Refills: 1 | Status: ACTIVE | COMMUNITY
Start: 2020-07-20 | End: 1900-01-01

## 2024-05-14 RX ORDER — LISINOPRIL 5 MG/1
5 TABLET ORAL DAILY
Qty: 30 | Refills: 3 | Status: COMPLETED | COMMUNITY
Start: 2021-08-05 | End: 2024-05-14

## 2024-05-14 RX ORDER — ERGOCALCIFEROL 1.25 MG/1
1.25 MG CAPSULE ORAL
Qty: 12 | Refills: 3 | Status: COMPLETED | COMMUNITY
Start: 2020-08-12 | End: 2024-05-14

## 2024-05-14 RX ORDER — ROSUVASTATIN CALCIUM 20 MG/1
20 TABLET, FILM COATED ORAL
Qty: 90 | Refills: 3 | Status: ACTIVE | COMMUNITY
Start: 2022-09-15 | End: 1900-01-01

## 2024-05-15 PROBLEM — R10.33 PERIUMBILICAL DISCOMFORT: Status: ACTIVE | Noted: 2024-05-15

## 2024-05-15 NOTE — END OF VISIT
[FreeTextEntry3] :  By signing my name below, I,  Rosita Wilson, attest that this documentation has been prepared under the direction and in the presence of Dian Baron PA-C.   I personally performed the services described in the documentation, reviewed the documentation recorded by the scribe in my presence, and it accurately and completely records my words and actions.

## 2024-05-15 NOTE — PHYSICAL EXAM
[No Acute Distress] : no acute distress [Well-Appearing] : well-appearing [No Edema] : there was no peripheral edema [Normal Appearance] : normal in appearance [No Nipple Discharge] : no nipple discharge [Normal] : no posterior cervical lymphadenopathy and no anterior cervical lymphadenopathy [Non-distended] : non-distended [No Masses] : no abdominal mass palpated [de-identified] : TTP but no mass at 5 o 'clock position L breast 3 cm from nipple. TTP in L axilla.   Sirisha Becker served as chaperone [de-identified] : LLE erythematous rash with xerosis,  R medial thigh and R shin nevi present

## 2024-05-15 NOTE — HISTORY OF PRESENT ILLNESS
[FreeTextEntry1] : follow up visit [de-identified] :  Pt is a 49 year old F with PMHx of GERD, DM2, HLD, HTN who presents to the office today for follow up visit.   Axillary Tenderness:  - Pt endorses feeling a "lump" and having discomfort under her L axilla for the last 3 months.  - She endorses this discomfort has worsened, struggles to raise her arm fully at times.  - Pt is anxious about having CA, grandmother had breast CA.  - Endorses the sensation is "achy, sore"  - Denies nipple change, discharge, skin discoloration, weightlifting   Rash/skin:  - Pt endorses having a rash on her LLE which is pruritic.  - She also endorses having a mole she is worried about, possibly growing in size.   Abdominal discomfort - Pt endorses feeling a "tugging" feeling for the last 2 months umbillical area. - She is worried about having a hernia.   HTN/HLD: - Pt has been off meds - forgets to take them  DM - Pt takes meds 3x a week, is on/off.   Hypothyroidism:  - Pt endorses taking levothyroxine

## 2024-05-16 ENCOUNTER — OUTPATIENT (OUTPATIENT)
Dept: OUTPATIENT SERVICES | Facility: HOSPITAL | Age: 49
LOS: 1 days | End: 2024-05-16
Payer: COMMERCIAL

## 2024-05-16 ENCOUNTER — RESULT REVIEW (OUTPATIENT)
Age: 49
End: 2024-05-16

## 2024-05-16 ENCOUNTER — APPOINTMENT (OUTPATIENT)
Dept: ULTRASOUND IMAGING | Facility: HOSPITAL | Age: 49
End: 2024-05-16

## 2024-05-16 ENCOUNTER — APPOINTMENT (OUTPATIENT)
Dept: MAMMOGRAPHY | Facility: HOSPITAL | Age: 49
End: 2024-05-16

## 2024-05-16 DIAGNOSIS — Z87.442 PERSONAL HISTORY OF URINARY CALCULI: Chronic | ICD-10-CM

## 2024-05-16 DIAGNOSIS — Z98.890 OTHER SPECIFIED POSTPROCEDURAL STATES: Chronic | ICD-10-CM

## 2024-05-16 PROCEDURE — 76856 US EXAM PELVIC COMPLETE: CPT | Mod: 26

## 2024-05-16 PROCEDURE — G0279: CPT | Mod: 26

## 2024-05-16 PROCEDURE — 77066 DX MAMMO INCL CAD BI: CPT

## 2024-05-16 PROCEDURE — 76641 ULTRASOUND BREAST COMPLETE: CPT | Mod: 26,50

## 2024-05-16 PROCEDURE — 77066 DX MAMMO INCL CAD BI: CPT | Mod: 26

## 2024-05-16 PROCEDURE — 76856 US EXAM PELVIC COMPLETE: CPT

## 2024-05-16 PROCEDURE — G0279: CPT

## 2024-05-16 PROCEDURE — 76641 ULTRASOUND BREAST COMPLETE: CPT

## 2024-05-17 ENCOUNTER — TRANSCRIPTION ENCOUNTER (OUTPATIENT)
Age: 49
End: 2024-05-17

## 2024-05-17 LAB
25(OH)D3 SERPL-MCNC: 22.8 NG/ML
ALBUMIN SERPL ELPH-MCNC: 4.4 G/DL
ALP BLD-CCNC: 69 U/L
ALT SERPL-CCNC: 19 U/L
ANION GAP SERPL CALC-SCNC: 11 MMOL/L
APPEARANCE: CLEAR
AST SERPL-CCNC: 13 U/L
BACTERIA: ABNORMAL /HPF
BASOPHILS # BLD AUTO: 0.03 K/UL
BASOPHILS NFR BLD AUTO: 0.4 %
BILIRUB SERPL-MCNC: 0.4 MG/DL
BILIRUBIN URINE: NEGATIVE
BLOOD URINE: NEGATIVE
BUN SERPL-MCNC: 7 MG/DL
CALCIUM SERPL-MCNC: 8.9 MG/DL
CAST: 4 /LPF
CHLORIDE SERPL-SCNC: 99 MMOL/L
CHOLEST SERPL-MCNC: 271 MG/DL
CO2 SERPL-SCNC: 24 MMOL/L
COLOR: YELLOW
CREAT SERPL-MCNC: 0.49 MG/DL
EGFR: 115 ML/MIN/1.73M2
EOSINOPHIL # BLD AUTO: 0.03 K/UL
EOSINOPHIL NFR BLD AUTO: 0.4 %
EPITHELIAL CELLS: 14 /HPF
ESTIMATED AVERAGE GLUCOSE: 237 MG/DL
GLUCOSE QUALITATIVE U: 500 MG/DL
GLUCOSE SERPL-MCNC: 220 MG/DL
HBA1C MFR BLD HPLC: 9.9 %
HCT VFR BLD CALC: 39.4 %
HCV AB SER QL: NONREACTIVE
HCV S/CO RATIO: 0.28 S/CO
HDLC SERPL-MCNC: 57 MG/DL
HGB BLD-MCNC: 13.1 G/DL
IMM GRANULOCYTES NFR BLD AUTO: 0.3 %
KETONES URINE: ABNORMAL MG/DL
LDLC SERPL CALC-MCNC: 195 MG/DL
LEUKOCYTE ESTERASE URINE: NEGATIVE
LYMPHOCYTES # BLD AUTO: 2.11 K/UL
LYMPHOCYTES NFR BLD AUTO: 29.5 %
MAN DIFF?: NORMAL
MCHC RBC-ENTMCNC: 28.1 PG
MCHC RBC-ENTMCNC: 33.2 GM/DL
MCV RBC AUTO: 84.4 FL
MICROSCOPIC-UA: NORMAL
MONOCYTES # BLD AUTO: 0.44 K/UL
MONOCYTES NFR BLD AUTO: 6.2 %
NEUTROPHILS # BLD AUTO: 4.52 K/UL
NEUTROPHILS NFR BLD AUTO: 63.2 %
NITRITE URINE: NEGATIVE
NONHDLC SERPL-MCNC: 215 MG/DL
PH URINE: 6.5
PLATELET # BLD AUTO: 230 K/UL
POTASSIUM SERPL-SCNC: 4 MMOL/L
PROT SERPL-MCNC: 7.6 G/DL
PROTEIN URINE: NORMAL MG/DL
RBC # BLD: 4.67 M/UL
RBC # FLD: 13.9 %
RED BLOOD CELLS URINE: 3 /HPF
REVIEW: NORMAL
SODIUM SERPL-SCNC: 134 MMOL/L
SPECIFIC GRAVITY URINE: 1.03
TRIGL SERPL-MCNC: 111 MG/DL
TSH SERPL-ACNC: 3.66 UIU/ML
UROBILINOGEN URINE: 1 MG/DL
WBC # FLD AUTO: 7.15 K/UL
WHITE BLOOD CELLS URINE: 4 /HPF

## 2024-05-30 ENCOUNTER — APPOINTMENT (OUTPATIENT)
Dept: ULTRASOUND IMAGING | Facility: CLINIC | Age: 49
End: 2024-05-30
Payer: COMMERCIAL

## 2024-05-30 PROCEDURE — 76705 ECHO EXAM OF ABDOMEN: CPT | Mod: 26

## 2024-06-28 ENCOUNTER — OUTPATIENT (OUTPATIENT)
Dept: OUTPATIENT SERVICES | Facility: HOSPITAL | Age: 49
LOS: 1 days | End: 2024-06-28

## 2024-06-28 ENCOUNTER — APPOINTMENT (OUTPATIENT)
Dept: ULTRASOUND IMAGING | Facility: HOSPITAL | Age: 49
End: 2024-06-28
Payer: COMMERCIAL

## 2024-06-28 DIAGNOSIS — Z98.890 OTHER SPECIFIED POSTPROCEDURAL STATES: Chronic | ICD-10-CM

## 2024-06-28 DIAGNOSIS — Z87.442 PERSONAL HISTORY OF URINARY CALCULI: Chronic | ICD-10-CM

## 2024-06-28 DIAGNOSIS — Z98.891 HISTORY OF UTERINE SCAR FROM PREVIOUS SURGERY: Chronic | ICD-10-CM

## 2024-06-28 PROCEDURE — 76856 US EXAM PELVIC COMPLETE: CPT

## 2024-06-28 PROCEDURE — 76830 TRANSVAGINAL US NON-OB: CPT | Mod: 26

## 2024-06-28 PROCEDURE — 76830 TRANSVAGINAL US NON-OB: CPT

## 2024-06-28 PROCEDURE — 76856 US EXAM PELVIC COMPLETE: CPT | Mod: 26

## 2024-07-05 ENCOUNTER — APPOINTMENT (OUTPATIENT)
Dept: DERMATOLOGY | Facility: CLINIC | Age: 49
End: 2024-07-05

## 2024-07-08 ENCOUNTER — RX RENEWAL (OUTPATIENT)
Age: 49
End: 2024-07-08

## 2024-07-24 ENCOUNTER — APPOINTMENT (OUTPATIENT)
Dept: INTERNAL MEDICINE | Facility: CLINIC | Age: 49
End: 2024-07-24

## 2024-10-03 ENCOUNTER — RX RENEWAL (OUTPATIENT)
Age: 49
End: 2024-10-03

## 2024-11-08 ENCOUNTER — APPOINTMENT (OUTPATIENT)
Dept: DERMATOLOGY | Facility: CLINIC | Age: 49
End: 2024-11-08
Payer: COMMERCIAL

## 2024-11-08 DIAGNOSIS — D22.9 MELANOCYTIC NEVI, UNSPECIFIED: ICD-10-CM

## 2024-11-08 DIAGNOSIS — Z12.83 ENCOUNTER FOR SCREENING FOR MALIGNANT NEOPLASM OF SKIN: ICD-10-CM

## 2024-11-08 DIAGNOSIS — D48.5 NEOPLASM OF UNCERTAIN BEHAVIOR OF SKIN: ICD-10-CM

## 2024-11-08 DIAGNOSIS — L82.1 OTHER SEBORRHEIC KERATOSIS: ICD-10-CM

## 2024-11-08 PROCEDURE — 11102 TANGNTL BX SKIN SINGLE LES: CPT

## 2024-11-08 PROCEDURE — 99203 OFFICE O/P NEW LOW 30 MIN: CPT | Mod: 25

## 2024-11-09 PROBLEM — L82.1 SEBORRHEIC KERATOSES: Status: ACTIVE | Noted: 2024-11-09

## 2024-11-09 PROBLEM — Z12.83 SCREENING EXAM FOR SKIN CANCER: Status: ACTIVE | Noted: 2024-11-09

## 2024-11-09 PROBLEM — D22.9 MULTIPLE BENIGN MELANOCYTIC NEVI: Status: ACTIVE | Noted: 2024-11-09

## 2024-11-09 PROBLEM — D48.5 NEOPLASM OF UNCERTAIN BEHAVIOR OF SKIN: Status: ACTIVE | Noted: 2024-11-08

## 2024-11-12 LAB — DERMATOLOGY BIOPSY: NORMAL

## 2024-11-17 NOTE — ED ADULT TRIAGE NOTE - PRO INTERPRETER NEED 2
"English

## 2024-11-18 ENCOUNTER — RX RENEWAL (OUTPATIENT)
Age: 49
End: 2024-11-18

## 2025-02-03 ENCOUNTER — RX RENEWAL (OUTPATIENT)
Age: 50
End: 2025-02-03

## 2025-02-27 ENCOUNTER — RX RENEWAL (OUTPATIENT)
Age: 50
End: 2025-02-27

## 2025-04-03 ENCOUNTER — RESULT CHARGE (OUTPATIENT)
Age: 50
End: 2025-04-03

## 2025-04-03 ENCOUNTER — APPOINTMENT (OUTPATIENT)
Dept: INTERNAL MEDICINE | Facility: CLINIC | Age: 50
End: 2025-04-03
Payer: COMMERCIAL

## 2025-04-03 ENCOUNTER — NON-APPOINTMENT (OUTPATIENT)
Age: 50
End: 2025-04-03

## 2025-04-03 VITALS
HEIGHT: 61 IN | OXYGEN SATURATION: 99 % | WEIGHT: 149 LBS | BODY MASS INDEX: 28.13 KG/M2 | DIASTOLIC BLOOD PRESSURE: 85 MMHG | HEART RATE: 72 BPM | TEMPERATURE: 98.4 F | SYSTOLIC BLOOD PRESSURE: 144 MMHG

## 2025-04-03 DIAGNOSIS — E66.811 OBESITY, CLASS 1: ICD-10-CM

## 2025-04-03 DIAGNOSIS — R10.12 LEFT UPPER QUADRANT PAIN: ICD-10-CM

## 2025-04-03 DIAGNOSIS — R51.9 HEADACHE, UNSPECIFIED: ICD-10-CM

## 2025-04-03 DIAGNOSIS — N76.0 ACUTE VAGINITIS: ICD-10-CM

## 2025-04-03 DIAGNOSIS — Z87.2 PERSONAL HISTORY OF DISEASES OF THE SKIN AND SUBCUTANEOUS TISSUE: ICD-10-CM

## 2025-04-03 DIAGNOSIS — Z00.00 ENCOUNTER FOR GENERAL ADULT MEDICAL EXAMINATION W/OUT ABNORMAL FINDINGS: ICD-10-CM

## 2025-04-03 DIAGNOSIS — I10 ESSENTIAL (PRIMARY) HYPERTENSION: ICD-10-CM

## 2025-04-03 DIAGNOSIS — R10.13 EPIGASTRIC PAIN: ICD-10-CM

## 2025-04-03 DIAGNOSIS — N89.8 OTHER SPECIFIED NONINFLAMMATORY DISORDERS OF VAGINA: ICD-10-CM

## 2025-04-03 DIAGNOSIS — B35.4 TINEA CORPORIS: ICD-10-CM

## 2025-04-03 DIAGNOSIS — R10.33 PERIUMBILICAL PAIN: ICD-10-CM

## 2025-04-03 DIAGNOSIS — E11.69 TYPE 2 DIABETES MELLITUS WITH OTHER SPECIFIED COMPLICATION: ICD-10-CM

## 2025-04-03 DIAGNOSIS — Z86.39 PERSONAL HISTORY OF OTHER ENDOCRINE, NUTRITIONAL AND METABOLIC DISEASE: ICD-10-CM

## 2025-04-03 DIAGNOSIS — Z12.11 ENCOUNTER FOR SCREENING FOR MALIGNANT NEOPLASM OF COLON: ICD-10-CM

## 2025-04-03 DIAGNOSIS — Z87.898 PERSONAL HISTORY OF OTHER SPECIFIED CONDITIONS: ICD-10-CM

## 2025-04-03 DIAGNOSIS — Z12.4 ENCOUNTER FOR SCREENING FOR MALIGNANT NEOPLASM OF CERVIX: ICD-10-CM

## 2025-04-03 DIAGNOSIS — M79.644 PAIN IN RIGHT FINGER(S): ICD-10-CM

## 2025-04-03 DIAGNOSIS — N64.4 MASTODYNIA: ICD-10-CM

## 2025-04-03 DIAGNOSIS — E78.5 TYPE 2 DIABETES MELLITUS WITH OTHER SPECIFIED COMPLICATION: ICD-10-CM

## 2025-04-03 DIAGNOSIS — Z12.83 ENCOUNTER FOR SCREENING FOR MALIGNANT NEOPLASM OF SKIN: ICD-10-CM

## 2025-04-03 DIAGNOSIS — Z23 ENCOUNTER FOR IMMUNIZATION: ICD-10-CM

## 2025-04-03 DIAGNOSIS — M79.629 PAIN IN UNSPECIFIED UPPER ARM: ICD-10-CM

## 2025-04-03 DIAGNOSIS — Z12.39 ENCOUNTER FOR OTHER SCREENING FOR MALIGNANT NEOPLASM OF BREAST: ICD-10-CM

## 2025-04-03 DIAGNOSIS — K21.9 GASTRO-ESOPHAGEAL REFLUX DISEASE W/OUT ESOPHAGITIS: ICD-10-CM

## 2025-04-03 DIAGNOSIS — I83.90 ASYMPTOMATIC VARICOSE VEINS OF UNSPECIFIED LOWER EXTREMITY: ICD-10-CM

## 2025-04-03 DIAGNOSIS — D48.19 OTHER SPECIFIED NEOPLASM OF UNCERTAIN BEHAVIOR OF CONNECTIVE AND OTHER SOFT TISSUE: ICD-10-CM

## 2025-04-03 DIAGNOSIS — N83.209 UNSPECIFIED OVARIAN CYST, UNSPECIFIED SIDE: ICD-10-CM

## 2025-04-03 DIAGNOSIS — E89.0 POSTPROCEDURAL HYPOTHYROIDISM: ICD-10-CM

## 2025-04-03 DIAGNOSIS — E55.9 VITAMIN D DEFICIENCY, UNSPECIFIED: ICD-10-CM

## 2025-04-03 DIAGNOSIS — R10.2 PELVIC AND PERINEAL PAIN: ICD-10-CM

## 2025-04-03 DIAGNOSIS — R21 RASH AND OTHER NONSPECIFIC SKIN ERUPTION: ICD-10-CM

## 2025-04-03 DIAGNOSIS — Z01.419 ENCOUNTER FOR GYNECOLOGICAL EXAMINATION (GENERAL) (ROUTINE) W/OUT ABNORMAL FINDINGS: ICD-10-CM

## 2025-04-03 DIAGNOSIS — Z11.3 ENCOUNTER FOR SCREENING FOR INFECTIONS WITH A PREDOMINANTLY SEXUAL MODE OF TRANSMISSION: ICD-10-CM

## 2025-04-03 DIAGNOSIS — S99.921A UNSPECIFIED INJURY OF RIGHT FOOT, INITIAL ENCOUNTER: ICD-10-CM

## 2025-04-03 DIAGNOSIS — R14.0 ABDOMINAL DISTENSION (GASEOUS): ICD-10-CM

## 2025-04-03 PROCEDURE — 36415 COLL VENOUS BLD VENIPUNCTURE: CPT

## 2025-04-03 PROCEDURE — 93000 ELECTROCARDIOGRAM COMPLETE: CPT

## 2025-04-03 PROCEDURE — 99386 PREV VISIT NEW AGE 40-64: CPT

## 2025-04-03 PROCEDURE — 99213 OFFICE O/P EST LOW 20 MIN: CPT | Mod: 25

## 2025-04-03 RX ORDER — ZOSTER VACCINE RECOMBINANT, ADJUVANTED 50 MCG/0.5
50 KIT INTRAMUSCULAR
Qty: 1 | Refills: 0 | Status: ACTIVE | OUTPATIENT
Start: 2025-04-03

## 2025-04-03 RX ORDER — TIRZEPATIDE 7.5 MG/.5ML
7.5 INJECTION, SOLUTION SUBCUTANEOUS
Qty: 2 | Refills: 0 | Status: ACTIVE | COMMUNITY
Start: 2025-04-03

## 2025-04-03 RX ORDER — URSODIOL 400 MG/1
400 CAPSULE ORAL
Refills: 0 | Status: ACTIVE | COMMUNITY
Start: 2025-04-03

## 2025-04-03 RX ORDER — CLOTRIMAZOLE 10 MG/G
1 CREAM TOPICAL
Qty: 45 | Refills: 0 | Status: ACTIVE | COMMUNITY
Start: 2025-04-03 | End: 1900-01-01

## 2025-04-04 LAB
25(OH)D3 SERPL-MCNC: 26.4 NG/ML
ALBUMIN SERPL ELPH-MCNC: 4.4 G/DL
ALP BLD-CCNC: 66 U/L
ALT SERPL-CCNC: 10 U/L
ANION GAP SERPL CALC-SCNC: 12 MMOL/L
APPEARANCE: CLEAR
AST SERPL-CCNC: 14 U/L
BACTERIA: ABNORMAL /HPF
BASOPHILS # BLD AUTO: 0.03 K/UL
BASOPHILS NFR BLD AUTO: 0.4 %
BILIRUB SERPL-MCNC: 0.3 MG/DL
BILIRUBIN URINE: NEGATIVE
BLOOD URINE: NEGATIVE
BUN SERPL-MCNC: 7 MG/DL
CALCIUM SERPL-MCNC: 9.1 MG/DL
CAST: 0 /LPF
CHLORIDE SERPL-SCNC: 103 MMOL/L
CHOLEST SERPL-MCNC: 257 MG/DL
CO2 SERPL-SCNC: 26 MMOL/L
COLOR: YELLOW
CREAT SERPL-MCNC: 0.48 MG/DL
CREAT SPEC-SCNC: 132 MG/DL
EGFRCR SERPLBLD CKD-EPI 2021: 115 ML/MIN/1.73M2
EOSINOPHIL # BLD AUTO: 0.05 K/UL
EOSINOPHIL NFR BLD AUTO: 0.7 %
EPITHELIAL CELLS: 8 /HPF
ESTIMATED AVERAGE GLUCOSE: 126 MG/DL
GLUCOSE QUALITATIVE U: NEGATIVE MG/DL
GLUCOSE SERPL-MCNC: 82 MG/DL
HBA1C MFR BLD HPLC: 6 %
HCT VFR BLD CALC: 40.7 %
HDLC SERPL-MCNC: 46 MG/DL
HGB BLD-MCNC: 13.1 G/DL
IMM GRANULOCYTES NFR BLD AUTO: 0.1 %
KETONES URINE: NEGATIVE MG/DL
LDLC SERPL-MCNC: 198 MG/DL
LEUKOCYTE ESTERASE URINE: NEGATIVE
LYMPHOCYTES # BLD AUTO: 2.02 K/UL
LYMPHOCYTES NFR BLD AUTO: 27.6 %
MAN DIFF?: NORMAL
MCHC RBC-ENTMCNC: 29.4 PG
MCHC RBC-ENTMCNC: 32.2 G/DL
MCV RBC AUTO: 91.3 FL
MICROALBUMIN 24H UR DL<=1MG/L-MCNC: <1.2 MG/DL
MICROALBUMIN/CREAT 24H UR-RTO: NORMAL MG/G
MICROSCOPIC-UA: NORMAL
MONOCYTES # BLD AUTO: 0.38 K/UL
MONOCYTES NFR BLD AUTO: 5.2 %
NEUTROPHILS # BLD AUTO: 4.83 K/UL
NEUTROPHILS NFR BLD AUTO: 66 %
NITRITE URINE: NEGATIVE
NONHDLC SERPL-MCNC: 210 MG/DL
PH URINE: 6
PLATELET # BLD AUTO: 255 K/UL
POTASSIUM SERPL-SCNC: 4.2 MMOL/L
PROT SERPL-MCNC: 7.7 G/DL
PROTEIN URINE: NEGATIVE MG/DL
RBC # BLD: 4.46 M/UL
RBC # FLD: 14.2 %
RED BLOOD CELLS URINE: 1 /HPF
SODIUM SERPL-SCNC: 141 MMOL/L
SPECIFIC GRAVITY URINE: 1.02
TRIGL SERPL-MCNC: 74 MG/DL
TSH SERPL-ACNC: 0.81 UIU/ML
UROBILINOGEN URINE: 0.2 MG/DL
WBC # FLD AUTO: 7.32 K/UL
WHITE BLOOD CELLS URINE: 3 /HPF

## 2025-04-14 ENCOUNTER — TRANSCRIPTION ENCOUNTER (OUTPATIENT)
Age: 50
End: 2025-04-14

## 2025-04-30 ENCOUNTER — APPOINTMENT (OUTPATIENT)
Dept: ORTHOPEDIC SURGERY | Facility: CLINIC | Age: 50
End: 2025-04-30
Payer: COMMERCIAL

## 2025-04-30 VITALS — RESPIRATION RATE: 18 BRPM | BODY MASS INDEX: 28.13 KG/M2 | HEIGHT: 61 IN | WEIGHT: 149 LBS

## 2025-04-30 DIAGNOSIS — M79.644 PAIN IN RIGHT FINGER(S): ICD-10-CM

## 2025-04-30 DIAGNOSIS — M15.1 HEBERDEN'S NODES (WITH ARTHROPATHY): ICD-10-CM

## 2025-04-30 PROCEDURE — 99203 OFFICE O/P NEW LOW 30 MIN: CPT

## 2025-04-30 PROCEDURE — 73140 X-RAY EXAM OF FINGER(S): CPT | Mod: RT

## 2025-05-22 ENCOUNTER — APPOINTMENT (OUTPATIENT)
Dept: OBGYN | Facility: CLINIC | Age: 50
End: 2025-05-22

## 2025-05-22 VITALS
SYSTOLIC BLOOD PRESSURE: 149 MMHG | WEIGHT: 149 LBS | DIASTOLIC BLOOD PRESSURE: 89 MMHG | HEART RATE: 74 BPM | BODY MASS INDEX: 28.15 KG/M2 | OXYGEN SATURATION: 98 %

## 2025-05-22 DIAGNOSIS — Z11.3 ENCOUNTER FOR SCREENING FOR INFECTIONS WITH A PREDOMINANTLY SEXUAL MODE OF TRANSMISSION: ICD-10-CM

## 2025-05-22 PROCEDURE — 99459 PELVIC EXAMINATION: CPT

## 2025-05-22 PROCEDURE — 99396 PREV VISIT EST AGE 40-64: CPT

## 2025-05-23 LAB
C TRACH RRNA SPEC QL NAA+PROBE: NOT DETECTED
HBV SURFACE AG SER QL: NONREACTIVE
HCV AB SER QL: NONREACTIVE
HCV S/CO RATIO: 0.3 S/CO
HIV1+2 AB SPEC QL IA.RAPID: NONREACTIVE
HPV HIGH+LOW RISK DNA PNL CVX: NOT DETECTED
N GONORRHOEA RRNA SPEC QL NAA+PROBE: NOT DETECTED
SOURCE TP AMPLIFICATION: NORMAL
T PALLIDUM AB SER QL IA: NEGATIVE
T VAGINALIS RRNA SPEC QL NAA+PROBE: NOT DETECTED

## 2025-05-28 LAB — CYTOLOGY CVX/VAG DOC THIN PREP: NORMAL

## 2025-06-05 ENCOUNTER — APPOINTMENT (OUTPATIENT)
Dept: GASTROENTEROLOGY | Facility: CLINIC | Age: 50
End: 2025-06-05
Payer: COMMERCIAL

## 2025-06-05 VITALS
DIASTOLIC BLOOD PRESSURE: 86 MMHG | HEART RATE: 82 BPM | BODY MASS INDEX: 28.51 KG/M2 | SYSTOLIC BLOOD PRESSURE: 138 MMHG | HEIGHT: 61 IN | OXYGEN SATURATION: 100 % | RESPIRATION RATE: 16 BRPM | TEMPERATURE: 98.1 F | WEIGHT: 151 LBS

## 2025-06-05 DIAGNOSIS — R10.13 EPIGASTRIC PAIN: ICD-10-CM

## 2025-06-05 DIAGNOSIS — Z12.11 ENCOUNTER FOR SCREENING FOR MALIGNANT NEOPLASM OF COLON: ICD-10-CM

## 2025-06-05 DIAGNOSIS — K59.00 CONSTIPATION, UNSPECIFIED: ICD-10-CM

## 2025-06-05 PROCEDURE — 99204 OFFICE O/P NEW MOD 45 MIN: CPT

## 2025-06-05 RX ORDER — POLYETHYLENE GLYCOL-3350 AND ELECTROLYTES WITH FLAVOR PACK 240; 5.84; 2.98; 6.72; 22.72 G/278.26G; G/278.26G; G/278.26G; G/278.26G; G/278.26G
240 POWDER, FOR SOLUTION ORAL
Qty: 1 | Refills: 0 | Status: ACTIVE | COMMUNITY
Start: 2025-06-05 | End: 1900-01-01

## 2025-06-06 ENCOUNTER — TRANSCRIPTION ENCOUNTER (OUTPATIENT)
Age: 50
End: 2025-06-06

## 2025-06-06 LAB — UREA BREATH TEST QL: NEGATIVE

## 2025-08-15 ENCOUNTER — NON-APPOINTMENT (OUTPATIENT)
Age: 50
End: 2025-08-15

## 2025-08-15 ENCOUNTER — APPOINTMENT (OUTPATIENT)
Age: 50
End: 2025-08-15
Payer: COMMERCIAL

## 2025-08-15 PROCEDURE — 45378 DIAGNOSTIC COLONOSCOPY: CPT
